# Patient Record
Sex: MALE | Race: WHITE | NOT HISPANIC OR LATINO | Employment: OTHER | URBAN - METROPOLITAN AREA
[De-identification: names, ages, dates, MRNs, and addresses within clinical notes are randomized per-mention and may not be internally consistent; named-entity substitution may affect disease eponyms.]

---

## 2023-06-03 LAB — POTASSIUM (MMOL/L) IN SER/PLAS: 4.5 MMOL/L (ref 3.5–5.3)

## 2023-06-11 PROCEDURE — G0180 MD CERTIFICATION HHA PATIENT: HCPCS | Performed by: FAMILY MEDICINE

## 2023-06-14 ENCOUNTER — TELEPHONE (OUTPATIENT)
Dept: PRIMARY CARE | Facility: CLINIC | Age: 88
End: 2023-06-14
Payer: MEDICARE

## 2023-06-14 NOTE — TELEPHONE ENCOUNTER
[facility] wanted to let you know he got picked up for Home Health Care and they are giving OT services.

## 2023-06-15 ENCOUNTER — TELEPHONE (OUTPATIENT)
Dept: PRIMARY CARE | Facility: CLINIC | Age: 88
End: 2023-06-15
Payer: MEDICARE

## 2023-06-21 LAB
ANION GAP IN SER/PLAS: 14 MMOL/L (ref 10–20)
CALCIUM (MG/DL) IN SER/PLAS: 8.2 MG/DL (ref 8.6–10.3)
CARBON DIOXIDE, TOTAL (MMOL/L) IN SER/PLAS: 19 MMOL/L (ref 21–32)
CHLORIDE (MMOL/L) IN SER/PLAS: 110 MMOL/L (ref 98–107)
CREATININE (MG/DL) IN SER/PLAS: 1.98 MG/DL (ref 0.5–1.3)
GFR MALE: 32 ML/MIN/1.73M2
GLUCOSE (MG/DL) IN SER/PLAS: 163 MG/DL (ref 74–99)
POTASSIUM (MMOL/L) IN SER/PLAS: 5.9 MMOL/L (ref 3.5–5.3)
SODIUM (MMOL/L) IN SER/PLAS: 137 MMOL/L (ref 136–145)
UREA NITROGEN (MG/DL) IN SER/PLAS: 57 MG/DL (ref 6–23)

## 2024-10-18 ENCOUNTER — OFFICE VISIT (OUTPATIENT)
Dept: URGENT CARE | Age: 89
End: 2024-10-18
Payer: MEDICARE

## 2024-10-18 VITALS
BODY MASS INDEX: 19.67 KG/M2 | DIASTOLIC BLOOD PRESSURE: 66 MMHG | SYSTOLIC BLOOD PRESSURE: 134 MMHG | HEART RATE: 82 BPM | RESPIRATION RATE: 16 BRPM | OXYGEN SATURATION: 98 % | TEMPERATURE: 97.6 F | WEIGHT: 145 LBS

## 2024-10-18 DIAGNOSIS — S91.104A OPEN WOUND OF SECOND TOE OF RIGHT FOOT, INITIAL ENCOUNTER: Primary | ICD-10-CM

## 2024-10-18 DIAGNOSIS — A46 ERYSIPELAS: ICD-10-CM

## 2024-10-18 PROCEDURE — 87075 CULTR BACTERIA EXCEPT BLOOD: CPT

## 2024-10-18 PROCEDURE — 87077 CULTURE AEROBIC IDENTIFY: CPT

## 2024-10-18 PROCEDURE — 87070 CULTURE OTHR SPECIMN AEROBIC: CPT

## 2024-10-18 RX ORDER — WARFARIN 1 MG/1
1 TABLET ORAL DAILY
COMMUNITY

## 2024-10-18 RX ORDER — SODIUM POLYSTYRENE SULFONATE 15 G/60ML
SUSPENSION ORAL
COMMUNITY

## 2024-10-18 RX ORDER — LOSARTAN POTASSIUM 25 MG/1
1 TABLET ORAL DAILY
COMMUNITY

## 2024-10-18 RX ORDER — NITROGLYCERIN 0.4 MG/1
TABLET SUBLINGUAL
COMMUNITY

## 2024-10-18 RX ORDER — CEPHALEXIN 500 MG/1
500 CAPSULE ORAL 4 TIMES DAILY
Qty: 40 CAPSULE | Refills: 0 | Status: SHIPPED | OUTPATIENT
Start: 2024-10-18 | End: 2024-10-28

## 2024-10-18 RX ORDER — ASPIRIN 81 MG/1
1 TABLET ORAL DAILY
COMMUNITY

## 2024-10-18 RX ORDER — ERGOCALCIFEROL 1.25 MG/1
1 CAPSULE ORAL
COMMUNITY

## 2024-10-18 RX ORDER — METOPROLOL SUCCINATE 50 MG/1
1 TABLET, EXTENDED RELEASE ORAL DAILY
COMMUNITY

## 2024-10-18 RX ORDER — LANSOPRAZOLE 30 MG/1
1 CAPSULE, DELAYED RELEASE ORAL DAILY
COMMUNITY

## 2024-10-18 RX ORDER — ISOSORBIDE MONONITRATE 30 MG/1
1 TABLET, EXTENDED RELEASE ORAL DAILY
COMMUNITY

## 2024-10-18 RX ORDER — MIRTAZAPINE 7.5 MG/1
1 TABLET, FILM COATED ORAL NIGHTLY
COMMUNITY
Start: 2024-10-07 | End: 2024-11-06

## 2024-10-18 RX ORDER — DOXAZOSIN 4 MG/1
1 TABLET ORAL DAILY
COMMUNITY
Start: 2024-07-05

## 2024-10-18 RX ORDER — ALPRAZOLAM 0.5 MG/1
TABLET ORAL
COMMUNITY

## 2024-10-18 RX ORDER — GABAPENTIN 100 MG/1
CAPSULE ORAL
COMMUNITY

## 2024-10-18 RX ORDER — RANOLAZINE 500 MG/1
500 TABLET, EXTENDED RELEASE ORAL
COMMUNITY
Start: 2024-07-19

## 2024-10-18 RX ORDER — QUETIAPINE FUMARATE 25 MG/1
1 TABLET, FILM COATED ORAL NIGHTLY
COMMUNITY
Start: 2024-10-07

## 2024-10-18 RX ORDER — ATORVASTATIN CALCIUM 40 MG/1
1 TABLET, FILM COATED ORAL DAILY
COMMUNITY

## 2024-10-18 RX ORDER — CELECOXIB 200 MG/1
1 CAPSULE ORAL DAILY
COMMUNITY

## 2024-10-18 RX ORDER — AMLODIPINE BESYLATE 2.5 MG/1
1 TABLET ORAL DAILY
COMMUNITY

## 2024-10-18 RX ORDER — TRAZODONE HYDROCHLORIDE 100 MG/1
100 TABLET ORAL DAILY PRN
COMMUNITY
Start: 2024-07-19 | End: 2025-07-19

## 2024-10-18 ASSESSMENT — ENCOUNTER SYMPTOMS: WOUND: 1

## 2024-10-18 ASSESSMENT — PAIN SCALES - GENERAL: PAINLEVEL_OUTOF10: 3

## 2024-10-18 NOTE — PATIENT INSTRUCTIONS
Please take antibiotics as prescribed, please follow wound care instructions as discussed.  May follow-up with wound care referral placed phone number [phone].  If pain at proportion, rapidly expanding redness or the presence of streaking please take patient to the emergency room or call [phone]

## 2024-10-18 NOTE — PROGRESS NOTES
"Subjective   Patient ID: Bebeto Cabezas is a 89 y.o. male. They present today with a chief complaint of Toe Pain (Right great toe and 2nd toe redness and swelling. Pt states he was wearing \"toe separators\" and elastic dug into 2nd toe causing symptoms. ).    History of Present Illness    Toe Pain    Patient is brought in by wife for a chief complaint of right second toe redness and swelling, believed to be caused by wound caused by toe  that patient reports left on approximately about 3 weeks as felt good to his feet.  Wife noticed redness and swelling of right foot prompting visit to the urgent care patient denies that area is tender to palpation, report of discharge from the area  Past Medical History  Allergies as of 10/18/2024    (No Known Allergies)       (Not in a hospital admission)       History reviewed. No pertinent past medical history.    History reviewed. No pertinent surgical history.         Review of Systems  Review of Systems   Skin:  Positive for wound.                                  Objective    Vitals:    10/18/24 1429   BP: 134/66   Pulse: 82   Resp: 16   Temp: 36.4 °C (97.6 °F)   SpO2: 98%   Weight: 65.8 kg (145 lb)     No LMP for male patient.    Physical Exam  Constitutional:       General: He is not in acute distress.     Appearance: Normal appearance. He is not ill-appearing, toxic-appearing or diaphoretic.   Skin:     Comments: Upon examination of sole of foot area under the right second toe there is the presence of an open wound with purulent discharge.  On dorsal aspect of foot over the digits there is the presence of erysipelas as well-defined margin that does not jumana with palpation.  Area nontender to palpation patient does have sensation of digits   Neurological:      Mental Status: He is alert and oriented to person, place, and time.   Psychiatric:         Mood and Affect: Mood normal.         Behavior: Behavior normal.         Procedures    Point of Care Test & " Imaging Results from this visit  No results found for this visit on 10/18/24.   No results found.    Diagnostic study results (if any) were reviewed by Bebeto Correia PA-C.    Assessment/Plan   Allergies, medications, history, and pertinent labs/EKGs/Imaging reviewed by Bebeto Correia PA-C.     Medical Decision Making  Wound culture obtained, patient will be placed on Keflex, I did discuss with wife that antibiotic regimen may change pending wound culture.  Several ointment packets of bacitracin provided, thin layer applied and nonadherent dressing applied, did go over wound care instructions.  I did give ER precautions such as pain out of proportion, rapidly expanding erythema or the presence of streaking if to occur patient is to be taken to the emergency room or call 911.  I also did place referral for wound care.    Orders and Diagnoses  Diagnoses and all orders for this visit:  Open wound of second toe of right foot, initial encounter  -     Tissue/Wound Culture/Smear  -     Referral to Wound Clinic; Future  -     cephalexin (Keflex) 500 mg capsule; Take 1 capsule (500 mg) by mouth 4 times a day for 10 days.      Medical Admin Record      Patient disposition: Home    Electronically signed by Bebeto Correia PA-C  2:57 PM

## 2024-10-20 LAB
BACTERIA SPEC CULT: ABNORMAL
GRAM STN SPEC: ABNORMAL
GRAM STN SPEC: ABNORMAL

## 2024-10-23 ENCOUNTER — APPOINTMENT (OUTPATIENT)
Dept: RADIOLOGY | Facility: HOSPITAL | Age: 89
End: 2024-10-23
Payer: MEDICARE

## 2024-10-23 ENCOUNTER — HOSPITAL ENCOUNTER (INPATIENT)
Facility: HOSPITAL | Age: 89
LOS: 2 days | Discharge: HOME | End: 2024-10-26
Attending: EMERGENCY MEDICINE | Admitting: STUDENT IN AN ORGANIZED HEALTH CARE EDUCATION/TRAINING PROGRAM
Payer: MEDICARE

## 2024-10-23 ENCOUNTER — APPOINTMENT (OUTPATIENT)
Dept: CARDIOLOGY | Facility: HOSPITAL | Age: 89
End: 2024-10-23
Payer: MEDICARE

## 2024-10-23 DIAGNOSIS — N39.0 LOWER URINARY TRACT INFECTION, ACUTE: ICD-10-CM

## 2024-10-23 DIAGNOSIS — N30.00 ACUTE CYSTITIS WITHOUT HEMATURIA: ICD-10-CM

## 2024-10-23 DIAGNOSIS — R10.84 GENERALIZED ABDOMINAL PAIN: Primary | ICD-10-CM

## 2024-10-23 DIAGNOSIS — I10 PRIMARY HYPERTENSION: ICD-10-CM

## 2024-10-23 DIAGNOSIS — K20.90 ESOPHAGITIS: ICD-10-CM

## 2024-10-23 DIAGNOSIS — R11.2 NAUSEA AND VOMITING, UNSPECIFIED VOMITING TYPE: ICD-10-CM

## 2024-10-23 LAB
ALBUMIN SERPL BCP-MCNC: 3.5 G/DL (ref 3.4–5)
ALP SERPL-CCNC: 76 U/L (ref 33–136)
ALT SERPL W P-5'-P-CCNC: 7 U/L (ref 10–52)
ANION GAP SERPL CALC-SCNC: 14 MMOL/L (ref 10–20)
APPEARANCE UR: CLEAR
AST SERPL W P-5'-P-CCNC: 16 U/L (ref 9–39)
B-LACTAMASE ORGANISM ISLT: NEGATIVE
BACTERIA #/AREA URNS AUTO: ABNORMAL /HPF
BACTERIA SPEC CULT: ABNORMAL
BASOPHILS # BLD AUTO: 0.04 X10*3/UL (ref 0–0.1)
BASOPHILS NFR BLD AUTO: 0.5 %
BILIRUB SERPL-MCNC: 0.7 MG/DL (ref 0–1.2)
BILIRUB UR STRIP.AUTO-MCNC: NEGATIVE MG/DL
BUN SERPL-MCNC: 24 MG/DL (ref 6–23)
CALCIUM SERPL-MCNC: 8.6 MG/DL (ref 8.6–10.3)
CARDIAC TROPONIN I PNL SERPL HS: 23 NG/L (ref 0–20)
CARDIAC TROPONIN I PNL SERPL HS: 23 NG/L (ref 0–20)
CHLORIDE SERPL-SCNC: 108 MMOL/L (ref 98–107)
CO2 SERPL-SCNC: 20 MMOL/L (ref 21–32)
COLOR UR: ABNORMAL
CREAT SERPL-MCNC: 1.78 MG/DL (ref 0.5–1.3)
EGFRCR SERPLBLD CKD-EPI 2021: 36 ML/MIN/1.73M*2
EOSINOPHIL # BLD AUTO: 0.08 X10*3/UL (ref 0–0.4)
EOSINOPHIL NFR BLD AUTO: 0.9 %
ERYTHROCYTE [DISTWIDTH] IN BLOOD BY AUTOMATED COUNT: 13.2 % (ref 11.5–14.5)
GLUCOSE SERPL-MCNC: 101 MG/DL (ref 74–99)
GLUCOSE UR STRIP.AUTO-MCNC: NORMAL MG/DL
GRAM STN SPEC: ABNORMAL
GRAM STN SPEC: ABNORMAL
HCT VFR BLD AUTO: 32.5 % (ref 41–52)
HGB BLD-MCNC: 10.6 G/DL (ref 13.5–17.5)
HYALINE CASTS #/AREA URNS AUTO: ABNORMAL /LPF
IMM GRANULOCYTES # BLD AUTO: 0.05 X10*3/UL (ref 0–0.5)
IMM GRANULOCYTES NFR BLD AUTO: 0.6 % (ref 0–0.9)
KETONES UR STRIP.AUTO-MCNC: NEGATIVE MG/DL
LACTATE SERPL-SCNC: 1.2 MMOL/L (ref 0.4–2)
LEUKOCYTE ESTERASE UR QL STRIP.AUTO: ABNORMAL
LIPASE SERPL-CCNC: 6 U/L (ref 9–82)
LYMPHOCYTES # BLD AUTO: 1.37 X10*3/UL (ref 0.8–3)
LYMPHOCYTES NFR BLD AUTO: 16.1 %
MCH RBC QN AUTO: 32.8 PG (ref 26–34)
MCHC RBC AUTO-ENTMCNC: 32.6 G/DL (ref 32–36)
MCV RBC AUTO: 101 FL (ref 80–100)
MONOCYTES # BLD AUTO: 0.7 X10*3/UL (ref 0.05–0.8)
MONOCYTES NFR BLD AUTO: 8.2 %
NEUTROPHILS # BLD AUTO: 6.28 X10*3/UL (ref 1.6–5.5)
NEUTROPHILS NFR BLD AUTO: 73.7 %
NITRITE UR QL STRIP.AUTO: ABNORMAL
NRBC BLD-RTO: 0 /100 WBCS (ref 0–0)
PH UR STRIP.AUTO: 5.5 [PH]
PLATELET # BLD AUTO: 239 X10*3/UL (ref 150–450)
POTASSIUM SERPL-SCNC: 4.1 MMOL/L (ref 3.5–5.3)
PROT SERPL-MCNC: 6 G/DL (ref 6.4–8.2)
PROT UR STRIP.AUTO-MCNC: ABNORMAL MG/DL
RBC # BLD AUTO: 3.23 X10*6/UL (ref 4.5–5.9)
RBC # UR STRIP.AUTO: ABNORMAL /UL
RBC #/AREA URNS AUTO: ABNORMAL /HPF
SODIUM SERPL-SCNC: 138 MMOL/L (ref 136–145)
SP GR UR STRIP.AUTO: 1.01
UROBILINOGEN UR STRIP.AUTO-MCNC: NORMAL MG/DL
WBC # BLD AUTO: 8.5 X10*3/UL (ref 4.4–11.3)
WBC #/AREA URNS AUTO: ABNORMAL /HPF

## 2024-10-23 PROCEDURE — 96375 TX/PRO/DX INJ NEW DRUG ADDON: CPT

## 2024-10-23 PROCEDURE — 80053 COMPREHEN METABOLIC PANEL: CPT | Performed by: EMERGENCY MEDICINE

## 2024-10-23 PROCEDURE — 81001 URINALYSIS AUTO W/SCOPE: CPT | Performed by: EMERGENCY MEDICINE

## 2024-10-23 PROCEDURE — 84484 ASSAY OF TROPONIN QUANT: CPT | Performed by: EMERGENCY MEDICINE

## 2024-10-23 PROCEDURE — 71045 X-RAY EXAM CHEST 1 VIEW: CPT | Performed by: RADIOLOGY

## 2024-10-23 PROCEDURE — 83690 ASSAY OF LIPASE: CPT | Performed by: EMERGENCY MEDICINE

## 2024-10-23 PROCEDURE — 96365 THER/PROPH/DIAG IV INF INIT: CPT

## 2024-10-23 PROCEDURE — 36415 COLL VENOUS BLD VENIPUNCTURE: CPT | Performed by: EMERGENCY MEDICINE

## 2024-10-23 PROCEDURE — 74176 CT ABD & PELVIS W/O CONTRAST: CPT

## 2024-10-23 PROCEDURE — 71045 X-RAY EXAM CHEST 1 VIEW: CPT

## 2024-10-23 PROCEDURE — 85025 COMPLETE CBC W/AUTO DIFF WBC: CPT | Performed by: EMERGENCY MEDICINE

## 2024-10-23 PROCEDURE — 87086 URINE CULTURE/COLONY COUNT: CPT | Mod: PARLAB | Performed by: EMERGENCY MEDICINE

## 2024-10-23 PROCEDURE — G0378 HOSPITAL OBSERVATION PER HR: HCPCS

## 2024-10-23 PROCEDURE — 96361 HYDRATE IV INFUSION ADD-ON: CPT

## 2024-10-23 PROCEDURE — 99284 EMERGENCY DEPT VISIT MOD MDM: CPT

## 2024-10-23 PROCEDURE — 99285 EMERGENCY DEPT VISIT HI MDM: CPT | Mod: 25

## 2024-10-23 PROCEDURE — 93005 ELECTROCARDIOGRAM TRACING: CPT

## 2024-10-23 PROCEDURE — 2500000004 HC RX 250 GENERAL PHARMACY W/ HCPCS (ALT 636 FOR OP/ED): Performed by: EMERGENCY MEDICINE

## 2024-10-23 PROCEDURE — 83605 ASSAY OF LACTIC ACID: CPT | Performed by: EMERGENCY MEDICINE

## 2024-10-23 RX ORDER — FENTANYL CITRATE 50 UG/ML
50 INJECTION, SOLUTION INTRAMUSCULAR; INTRAVENOUS ONCE
Status: COMPLETED | OUTPATIENT
Start: 2024-10-23 | End: 2024-10-23

## 2024-10-23 RX ORDER — ONDANSETRON HYDROCHLORIDE 2 MG/ML
4 INJECTION, SOLUTION INTRAVENOUS ONCE
Status: COMPLETED | OUTPATIENT
Start: 2024-10-23 | End: 2024-10-23

## 2024-10-23 RX ORDER — CEFTRIAXONE 1 G/50ML
1 INJECTION, SOLUTION INTRAVENOUS ONCE
Status: COMPLETED | OUTPATIENT
Start: 2024-10-23 | End: 2024-10-24

## 2024-10-23 ASSESSMENT — COLUMBIA-SUICIDE SEVERITY RATING SCALE - C-SSRS
6. HAVE YOU EVER DONE ANYTHING, STARTED TO DO ANYTHING, OR PREPARED TO DO ANYTHING TO END YOUR LIFE?: NO
2. HAVE YOU ACTUALLY HAD ANY THOUGHTS OF KILLING YOURSELF?: NO
1. IN THE PAST MONTH, HAVE YOU WISHED YOU WERE DEAD OR WISHED YOU COULD GO TO SLEEP AND NOT WAKE UP?: NO

## 2024-10-23 NOTE — ED PROVIDER NOTES
HPI   Chief Complaint   Patient presents with    Abdominal Pain    Constipation       Patient is a 89-year-old male, medical history significant for dementia, prior intracranial hemorrhage, chronic abdominal pain, that presents to the emergency department with abdominal pain.  Patient has a history of chronic abdominal pain.  Apparently, he is normally on Percocet.  At some point, this was held because of constipation.  Today, he was complaining of some increasing abdominal pain.  He states it is in bilateral lower quadrants.  He denies nausea.  He also admits to some slight chest pain.  Patient's history is hard to follow with his history of dementia.              Patient History   No past medical history on file.  No past surgical history on file.  No family history on file.  Social History     Tobacco Use    Smoking status: Not on file    Smokeless tobacco: Not on file   Substance Use Topics    Alcohol use: Not on file    Drug use: Not on file       Physical Exam   ED Triage Vitals   Temp Pulse Resp BP   -- -- -- --      SpO2 Temp src Heart Rate Source Patient Position   -- -- -- --      BP Location FiO2 (%)     -- --       Physical Exam  Vitals and nursing note reviewed.   Constitutional:       General: He is not in acute distress.     Appearance: He is well-developed.   HENT:      Head: Normocephalic and atraumatic.   Eyes:      Extraocular Movements: Extraocular movements intact.      Conjunctiva/sclera: Conjunctivae normal.      Pupils: Pupils are equal, round, and reactive to light.   Cardiovascular:      Rate and Rhythm: Normal rate and regular rhythm.      Heart sounds: No murmur heard.  Pulmonary:      Effort: Pulmonary effort is normal. No respiratory distress.      Breath sounds: Normal breath sounds.   Abdominal:      General: Abdomen is flat.      Palpations: Abdomen is soft.      Tenderness: There is abdominal tenderness.   Musculoskeletal:         General: No swelling.      Cervical back: Neck  supple.   Skin:     General: Skin is warm and dry.      Capillary Refill: Capillary refill takes less than 2 seconds.   Neurological:      General: No focal deficit present.      Mental Status: He is alert. Mental status is at baseline.   Psychiatric:         Mood and Affect: Mood normal.           ED Course & [facility]   ED Course as of 10/23/24 2311   Wed Oct 23, 2024   1938 Troponin indeterminate at 23. [MK]   2005 Lactic acid is normal. [MK]   2005 Metabolic panel does demonstrate mildly decreased bicarb.  Creatinine is at the patient's baseline. [MK]   2102 Cardiac enzymes stable. [MK]   2150 CT does not show any evidence of acute intra-abdominal process. [MK]   2221 Urine does show some trace evidence of infection. [MK]      ED Course User Index  [MK] Alexi Garnica MD         Diagnoses as of 10/23/24 2311   Generalized abdominal pain   Nausea and vomiting, unspecified vomiting type   Lower urinary tract infection, acute                 No data recorded     Samia Coma Scale Score: 14 (10/23/24 1918 : Moni Byrd RN)                           Medical Decision Making  Medical Decision Making: I was able get more history from the patient's family when they arrived.  Apparently, the patient was diagnosed with a toe infection.  He was placed on Bactrim.  He had stopped his Percocet over the weekend but it was resumed about 24 hours ago.  He had multiple large bowel movements over the weekend.  However, for the past 3 days he has been complaining of chest pain, difficulty swallowing, and upper abdominal pain.  Even with his Percocet, his pain was not controlled.  Laboratory workup was pursued.  Patient does have mildly diminished bicarb likely secondary from his vomiting and diminished oral intake.  His renal function is at his baseline.  We did obtain CT imaging.  There is no evidence of acute intra-abdominal process.  At this point, I do for the patient would benefit from observation given persistence of  symptoms.    EKG interpreted by myself (ED attending physician): Sinus rhythm.  First-degree AV block.  Left axis.  Right bundle branch block.  No acute ischemia.  No STEMI.    Differential Diagnoses Considered: Bowel obstruction, pancreatitis, opiate withdrawal, ACS    Chronic Medical Conditions Significantly Affecting Care: History of chronic abdominal pain    External Records Reviewed: I reviewed recent and relevant outside records including: Outpatient medication reconciliation    Independent Interpretation of Studies:  I independently interpreted: CT obtained with    Escalation of Care:  Appropriate for observation    Social Determinants of Health Significantly Affecting Care:  No known social determinant    Prescription Drug Consideration: IV fluids, analgesia    Diagnostic testing considered: 6    Discussion of Management with Other Providers:   I discussed the patient/results with: Admitting provider agrees plan of care          Procedure  Procedures     Alexi Garnica MD  10/23/24 6929       Alexi Garnica MD  10/23/24 7365     Vemu

## 2024-10-23 NOTE — ED TRIAGE NOTES
Pt comes to the ED via EMS from home. He stopped taking his percocet a few days ago due to constipation. He is still experiencing constipation as well as lower abdominal pain.

## 2024-10-24 ENCOUNTER — ANESTHESIA (OUTPATIENT)
Dept: GASTROENTEROLOGY | Facility: HOSPITAL | Age: 89
End: 2024-10-24
Payer: MEDICARE

## 2024-10-24 ENCOUNTER — APPOINTMENT (OUTPATIENT)
Dept: GASTROENTEROLOGY | Facility: HOSPITAL | Age: 89
End: 2024-10-24
Payer: MEDICARE

## 2024-10-24 ENCOUNTER — ANESTHESIA EVENT (OUTPATIENT)
Dept: GASTROENTEROLOGY | Facility: HOSPITAL | Age: 89
End: 2024-10-24
Payer: MEDICARE

## 2024-10-24 PROBLEM — R10.84 GENERALIZED ABDOMINAL PAIN: Status: ACTIVE | Noted: 2024-10-24

## 2024-10-24 LAB
ANION GAP SERPL CALC-SCNC: 13 MMOL/L (ref 10–20)
ATRIAL RATE: 78 BPM
BUN SERPL-MCNC: 23 MG/DL (ref 6–23)
CALCIUM SERPL-MCNC: 8.1 MG/DL (ref 8.6–10.3)
CHLORIDE SERPL-SCNC: 108 MMOL/L (ref 98–107)
CO2 SERPL-SCNC: 20 MMOL/L (ref 21–32)
CREAT SERPL-MCNC: 1.71 MG/DL (ref 0.5–1.3)
EGFRCR SERPLBLD CKD-EPI 2021: 38 ML/MIN/1.73M*2
GLUCOSE SERPL-MCNC: 81 MG/DL (ref 74–99)
HOLD SPECIMEN: NORMAL
HOLD SPECIMEN: NORMAL
P AXIS: 15 DEGREES
P OFFSET: 136 MS
P ONSET: 68 MS
POTASSIUM SERPL-SCNC: 4.2 MMOL/L (ref 3.5–5.3)
PR INTERVAL: 294 MS
Q ONSET: 215 MS
QRS COUNT: 13 BEATS
QRS DURATION: 122 MS
QT INTERVAL: 402 MS
QTC CALCULATION(BAZETT): 458 MS
QTC FREDERICIA: 438 MS
R AXIS: -64 DEGREES
SODIUM SERPL-SCNC: 137 MMOL/L (ref 136–145)
T AXIS: 9 DEGREES
T OFFSET: 416 MS
VENTRICULAR RATE: 78 BPM

## 2024-10-24 PROCEDURE — 7100000002 HC RECOVERY ROOM TIME - EACH INCREMENTAL 1 MINUTE

## 2024-10-24 PROCEDURE — 3700000001 HC GENERAL ANESTHESIA TIME - INITIAL BASE CHARGE

## 2024-10-24 PROCEDURE — 1100000001 HC PRIVATE ROOM DAILY

## 2024-10-24 PROCEDURE — 92610 EVALUATE SWALLOWING FUNCTION: CPT | Mod: GN

## 2024-10-24 PROCEDURE — 2500000002 HC RX 250 W HCPCS SELF ADMINISTERED DRUGS (ALT 637 FOR MEDICARE OP, ALT 636 FOR OP/ED): Performed by: INTERNAL MEDICINE

## 2024-10-24 PROCEDURE — 3700000002 HC GENERAL ANESTHESIA TIME - EACH INCREMENTAL 1 MINUTE

## 2024-10-24 PROCEDURE — 2500000001 HC RX 250 WO HCPCS SELF ADMINISTERED DRUGS (ALT 637 FOR MEDICARE OP): Performed by: INTERNAL MEDICINE

## 2024-10-24 PROCEDURE — 80048 BASIC METABOLIC PNL TOTAL CA: CPT | Performed by: INTERNAL MEDICINE

## 2024-10-24 PROCEDURE — 2500000002 HC RX 250 W HCPCS SELF ADMINISTERED DRUGS (ALT 637 FOR MEDICARE OP, ALT 636 FOR OP/ED): Performed by: STUDENT IN AN ORGANIZED HEALTH CARE EDUCATION/TRAINING PROGRAM

## 2024-10-24 PROCEDURE — 2500000004 HC RX 250 GENERAL PHARMACY W/ HCPCS (ALT 636 FOR OP/ED): Performed by: STUDENT IN AN ORGANIZED HEALTH CARE EDUCATION/TRAINING PROGRAM

## 2024-10-24 PROCEDURE — 2500000001 HC RX 250 WO HCPCS SELF ADMINISTERED DRUGS (ALT 637 FOR MEDICARE OP): Performed by: NURSE PRACTITIONER

## 2024-10-24 PROCEDURE — 2500000001 HC RX 250 WO HCPCS SELF ADMINISTERED DRUGS (ALT 637 FOR MEDICARE OP): Performed by: STUDENT IN AN ORGANIZED HEALTH CARE EDUCATION/TRAINING PROGRAM

## 2024-10-24 PROCEDURE — 43239 EGD BIOPSY SINGLE/MULTIPLE: CPT | Performed by: STUDENT IN AN ORGANIZED HEALTH CARE EDUCATION/TRAINING PROGRAM

## 2024-10-24 PROCEDURE — 2500000004 HC RX 250 GENERAL PHARMACY W/ HCPCS (ALT 636 FOR OP/ED): Performed by: NURSE ANESTHETIST, CERTIFIED REGISTERED

## 2024-10-24 PROCEDURE — 7100000001 HC RECOVERY ROOM TIME - INITIAL BASE CHARGE

## 2024-10-24 PROCEDURE — 99222 1ST HOSP IP/OBS MODERATE 55: CPT | Performed by: INTERNAL MEDICINE

## 2024-10-24 PROCEDURE — 36415 COLL VENOUS BLD VENIPUNCTURE: CPT | Performed by: INTERNAL MEDICINE

## 2024-10-24 PROCEDURE — 0DB38ZX EXCISION OF LOWER ESOPHAGUS, VIA NATURAL OR ARTIFICIAL OPENING ENDOSCOPIC, DIAGNOSTIC: ICD-10-PCS | Performed by: STUDENT IN AN ORGANIZED HEALTH CARE EDUCATION/TRAINING PROGRAM

## 2024-10-24 PROCEDURE — 99223 1ST HOSP IP/OBS HIGH 75: CPT | Performed by: STUDENT IN AN ORGANIZED HEALTH CARE EDUCATION/TRAINING PROGRAM

## 2024-10-24 RX ORDER — ISOSORBIDE MONONITRATE 30 MG/1
30 TABLET, EXTENDED RELEASE ORAL 2 TIMES DAILY
Status: DISCONTINUED | OUTPATIENT
Start: 2024-10-24 | End: 2024-10-26 | Stop reason: HOSPADM

## 2024-10-24 RX ORDER — AMOXICILLIN 250 MG
2 CAPSULE ORAL 2 TIMES DAILY
Status: DISCONTINUED | OUTPATIENT
Start: 2024-10-24 | End: 2024-10-26 | Stop reason: HOSPADM

## 2024-10-24 RX ORDER — AMLODIPINE BESYLATE 5 MG/1
5 TABLET ORAL DAILY PRN
Status: DISCONTINUED | OUTPATIENT
Start: 2024-10-24 | End: 2024-10-26 | Stop reason: HOSPADM

## 2024-10-24 RX ORDER — TRAZODONE HYDROCHLORIDE 50 MG/1
25 TABLET ORAL NIGHTLY
Status: DISCONTINUED | OUTPATIENT
Start: 2024-10-24 | End: 2024-10-26 | Stop reason: HOSPADM

## 2024-10-24 RX ORDER — MELATONIN 10 MG
10 CAPSULE ORAL NIGHTLY
COMMUNITY

## 2024-10-24 RX ORDER — DEXTROMETHORPHAN/PSEUDOEPHED 2.5-7.5/.8
80 DROPS ORAL 4 TIMES DAILY
Status: DISCONTINUED | OUTPATIENT
Start: 2024-10-24 | End: 2024-10-26 | Stop reason: HOSPADM

## 2024-10-24 RX ORDER — RANOLAZINE 500 MG/1
500 TABLET, EXTENDED RELEASE ORAL DAILY
Status: DISCONTINUED | OUTPATIENT
Start: 2024-10-24 | End: 2024-10-26 | Stop reason: HOSPADM

## 2024-10-24 RX ORDER — SUCRALFATE 1 G/1
1 TABLET ORAL
Status: DISCONTINUED | OUTPATIENT
Start: 2024-10-24 | End: 2024-10-24

## 2024-10-24 RX ORDER — QUETIAPINE FUMARATE 25 MG/1
12.5 TABLET, FILM COATED ORAL NIGHTLY
Status: DISCONTINUED | OUTPATIENT
Start: 2024-10-24 | End: 2024-10-26 | Stop reason: HOSPADM

## 2024-10-24 RX ORDER — CEFTRIAXONE 1 G/50ML
1 INJECTION, SOLUTION INTRAVENOUS DAILY
Status: DISCONTINUED | OUTPATIENT
Start: 2024-10-24 | End: 2024-10-24

## 2024-10-24 RX ORDER — SUCRALFATE 1 G/10ML
1 SUSPENSION ORAL EVERY 6 HOURS SCHEDULED
Status: DISCONTINUED | OUTPATIENT
Start: 2024-10-24 | End: 2024-10-26 | Stop reason: HOSPADM

## 2024-10-24 RX ORDER — LABETALOL HYDROCHLORIDE 5 MG/ML
10 INJECTION, SOLUTION INTRAVENOUS EVERY 4 HOURS PRN
Status: DISCONTINUED | OUTPATIENT
Start: 2024-10-24 | End: 2024-10-26 | Stop reason: HOSPADM

## 2024-10-24 RX ORDER — SULFAMETHOXAZOLE AND TRIMETHOPRIM 800; 160 MG/1; MG/1
1 TABLET ORAL EVERY 12 HOURS SCHEDULED
Status: DISCONTINUED | OUTPATIENT
Start: 2024-10-24 | End: 2024-10-26

## 2024-10-24 RX ORDER — SODIUM CHLORIDE 0.9 % (FLUSH) 0.9 %
SYRINGE (ML) INJECTION AS NEEDED
Status: DISCONTINUED | OUTPATIENT
Start: 2024-10-24 | End: 2024-10-24

## 2024-10-24 RX ORDER — ONDANSETRON HYDROCHLORIDE 2 MG/ML
4 INJECTION, SOLUTION INTRAVENOUS EVERY 8 HOURS PRN
Status: DISCONTINUED | OUTPATIENT
Start: 2024-10-24 | End: 2024-10-26 | Stop reason: HOSPADM

## 2024-10-24 RX ORDER — METOPROLOL SUCCINATE 25 MG/1
25 TABLET, EXTENDED RELEASE ORAL DAILY
COMMUNITY
End: 2024-10-26 | Stop reason: HOSPADM

## 2024-10-24 RX ORDER — ACETAMINOPHEN 325 MG/1
650 TABLET ORAL EVERY 4 HOURS PRN
Status: DISCONTINUED | OUTPATIENT
Start: 2024-10-24 | End: 2024-10-26 | Stop reason: HOSPADM

## 2024-10-24 RX ORDER — PROPOFOL 10 MG/ML
INJECTION, EMULSION INTRAVENOUS AS NEEDED
Status: DISCONTINUED | OUTPATIENT
Start: 2024-10-24 | End: 2024-10-24

## 2024-10-24 RX ORDER — POLYETHYLENE GLYCOL 3350 17 G/17G
17 POWDER, FOR SOLUTION ORAL 2 TIMES DAILY
Status: DISCONTINUED | OUTPATIENT
Start: 2024-10-24 | End: 2024-10-26 | Stop reason: HOSPADM

## 2024-10-24 RX ORDER — PANTOPRAZOLE SODIUM 40 MG/1
40 TABLET, DELAYED RELEASE ORAL
Status: DISCONTINUED | OUTPATIENT
Start: 2024-10-24 | End: 2024-10-26 | Stop reason: HOSPADM

## 2024-10-24 RX ORDER — DOXAZOSIN 2 MG/1
4 TABLET ORAL NIGHTLY
Status: DISCONTINUED | OUTPATIENT
Start: 2024-10-24 | End: 2024-10-26 | Stop reason: HOSPADM

## 2024-10-24 RX ORDER — ACETAMINOPHEN 500 MG
5 TABLET ORAL NIGHTLY PRN
Status: DISCONTINUED | OUTPATIENT
Start: 2024-10-24 | End: 2024-10-24

## 2024-10-24 RX ORDER — METOPROLOL SUCCINATE 25 MG/1
25 TABLET, EXTENDED RELEASE ORAL DAILY
Status: DISCONTINUED | OUTPATIENT
Start: 2024-10-24 | End: 2024-10-26

## 2024-10-24 RX ORDER — TRAZODONE HYDROCHLORIDE 50 MG/1
25 TABLET ORAL NIGHTLY
COMMUNITY

## 2024-10-24 RX ORDER — LIDOCAINE HCL/PF 100 MG/5ML
SYRINGE (ML) INTRAVENOUS AS NEEDED
Status: DISCONTINUED | OUTPATIENT
Start: 2024-10-24 | End: 2024-10-24

## 2024-10-24 RX ORDER — PANTOPRAZOLE SODIUM 40 MG/1
40 TABLET, DELAYED RELEASE ORAL DAILY
Status: DISCONTINUED | OUTPATIENT
Start: 2024-10-24 | End: 2024-10-24

## 2024-10-24 RX ORDER — ACETAMINOPHEN 500 MG
10 TABLET ORAL NIGHTLY PRN
Status: DISCONTINUED | OUTPATIENT
Start: 2024-10-24 | End: 2024-10-26 | Stop reason: HOSPADM

## 2024-10-24 RX ORDER — POLYETHYLENE GLYCOL 3350 17 G/17G
17 POWDER, FOR SOLUTION ORAL DAILY PRN
Status: DISCONTINUED | OUTPATIENT
Start: 2024-10-24 | End: 2024-10-26 | Stop reason: HOSPADM

## 2024-10-24 SDOH — SOCIAL STABILITY: SOCIAL INSECURITY: WITHIN THE LAST YEAR, HAVE YOU BEEN AFRAID OF YOUR PARTNER OR EX-PARTNER?: NO

## 2024-10-24 SDOH — SOCIAL STABILITY: SOCIAL INSECURITY: WERE YOU ABLE TO COMPLETE ALL THE BEHAVIORAL HEALTH SCREENINGS?: YES

## 2024-10-24 SDOH — ECONOMIC STABILITY: FOOD INSECURITY: WITHIN THE PAST 12 MONTHS, THE FOOD YOU BOUGHT JUST DIDN'T LAST AND YOU DIDN'T HAVE MONEY TO GET MORE.: NEVER TRUE

## 2024-10-24 SDOH — SOCIAL STABILITY: SOCIAL INSECURITY: HAS ANYONE EVER THREATENED TO HURT YOUR FAMILY OR YOUR PETS?: NO

## 2024-10-24 SDOH — ECONOMIC STABILITY: INCOME INSECURITY: IN THE PAST 12 MONTHS HAS THE ELECTRIC, GAS, OIL, OR WATER COMPANY THREATENED TO SHUT OFF SERVICES IN YOUR HOME?: NO

## 2024-10-24 SDOH — SOCIAL STABILITY: SOCIAL INSECURITY: ABUSE: ADULT

## 2024-10-24 SDOH — SOCIAL STABILITY: SOCIAL INSECURITY: ARE YOU OR HAVE YOU BEEN THREATENED OR ABUSED PHYSICALLY, EMOTIONALLY, OR SEXUALLY BY ANYONE?: NO

## 2024-10-24 SDOH — ECONOMIC STABILITY: FOOD INSECURITY: WITHIN THE PAST 12 MONTHS, YOU WORRIED THAT YOUR FOOD WOULD RUN OUT BEFORE YOU GOT THE MONEY TO BUY MORE.: NEVER TRUE

## 2024-10-24 SDOH — SOCIAL STABILITY: SOCIAL INSECURITY: HAVE YOU HAD THOUGHTS OF HARMING ANYONE ELSE?: NO

## 2024-10-24 SDOH — SOCIAL STABILITY: SOCIAL INSECURITY: HAVE YOU HAD ANY THOUGHTS OF HARMING ANYONE ELSE?: NO

## 2024-10-24 SDOH — SOCIAL STABILITY: SOCIAL INSECURITY: DO YOU FEEL ANYONE HAS EXPLOITED OR TAKEN ADVANTAGE OF YOU FINANCIALLY OR OF YOUR PERSONAL PROPERTY?: NO

## 2024-10-24 SDOH — SOCIAL STABILITY: SOCIAL INSECURITY: DO YOU FEEL UNSAFE GOING BACK TO THE PLACE WHERE YOU ARE LIVING?: NO

## 2024-10-24 SDOH — SOCIAL STABILITY: SOCIAL INSECURITY: ARE THERE ANY APPARENT SIGNS OF INJURIES/BEHAVIORS THAT COULD BE RELATED TO ABUSE/NEGLECT?: NO

## 2024-10-24 SDOH — SOCIAL STABILITY: SOCIAL INSECURITY
WITHIN THE LAST YEAR, HAVE YOU BEEN KICKED, HIT, SLAPPED, OR OTHERWISE PHYSICALLY HURT BY YOUR PARTNER OR EX-PARTNER?: NO

## 2024-10-24 SDOH — SOCIAL STABILITY: SOCIAL INSECURITY: WITHIN THE LAST YEAR, HAVE YOU BEEN HUMILIATED OR EMOTIONALLY ABUSED IN OTHER WAYS BY YOUR PARTNER OR EX-PARTNER?: NO

## 2024-10-24 SDOH — SOCIAL STABILITY: SOCIAL INSECURITY: DOES ANYONE TRY TO KEEP YOU FROM HAVING/CONTACTING OTHER FRIENDS OR DOING THINGS OUTSIDE YOUR HOME?: NO

## 2024-10-24 SDOH — SOCIAL STABILITY: SOCIAL INSECURITY
WITHIN THE LAST YEAR, HAVE YOU BEEN RAPED OR FORCED TO HAVE ANY KIND OF SEXUAL ACTIVITY BY YOUR PARTNER OR EX-PARTNER?: NO

## 2024-10-24 SDOH — HEALTH STABILITY: MENTAL HEALTH: CURRENT SMOKER: 0

## 2024-10-24 ASSESSMENT — ACTIVITIES OF DAILY LIVING (ADL)
DRESSING YOURSELF: NEEDS ASSISTANCE
JUDGMENT_ADEQUATE_SAFELY_COMPLETE_DAILY_ACTIVITIES: YES
ADEQUATE_TO_COMPLETE_ADL: YES
PATIENT'S MEMORY ADEQUATE TO SAFELY COMPLETE DAILY ACTIVITIES?: YES
HEARING - RIGHT EAR: FUNCTIONAL
LACK_OF_TRANSPORTATION: NO
HEARING - LEFT EAR: FUNCTIONAL
TOILETING: NEEDS ASSISTANCE
ASSISTIVE_DEVICE: WHEELCHAIR
BATHING: NEEDS ASSISTANCE
GROOMING: INDEPENDENT
WALKS IN HOME: NEEDS ASSISTANCE
FEEDING YOURSELF: INDEPENDENT

## 2024-10-24 ASSESSMENT — ENCOUNTER SYMPTOMS
FREQUENCY: 0
DIARRHEA: 1
SHORTNESS OF BREATH: 0
DIFFICULTY URINATING: 0
FEVER: 0
WHEEZING: 0
CHILLS: 0
DIZZINESS: 0
HEADACHES: 0
COUGH: 0
CONSTIPATION: 0
NAUSEA: 1
MYALGIAS: 0
VOMITING: 1
DYSURIA: 0
WOUND: 0
SORE THROAT: 0
PALPITATIONS: 0
ABDOMINAL PAIN: 0

## 2024-10-24 ASSESSMENT — LIFESTYLE VARIABLES
AUDIT-C TOTAL SCORE: 0
HOW MANY STANDARD DRINKS CONTAINING ALCOHOL DO YOU HAVE ON A TYPICAL DAY: PATIENT DOES NOT DRINK
SKIP TO QUESTIONS 9-10: 1
HOW OFTEN DO YOU HAVE 6 OR MORE DRINKS ON ONE OCCASION: NEVER
HOW OFTEN DO YOU HAVE A DRINK CONTAINING ALCOHOL: NEVER
AUDIT-C TOTAL SCORE: 0
AUDIT-C TOTAL SCORE: 0
HOW OFTEN DO YOU HAVE 6 OR MORE DRINKS ON ONE OCCASION: NEVER
HOW MANY STANDARD DRINKS CONTAINING ALCOHOL DO YOU HAVE ON A TYPICAL DAY: PATIENT DOES NOT DRINK
AUDIT-C TOTAL SCORE: 0
SKIP TO QUESTIONS 9-10: 1
HOW OFTEN DO YOU HAVE A DRINK CONTAINING ALCOHOL: NEVER

## 2024-10-24 ASSESSMENT — PAIN - FUNCTIONAL ASSESSMENT
PAIN_FUNCTIONAL_ASSESSMENT: 0-10

## 2024-10-24 ASSESSMENT — COGNITIVE AND FUNCTIONAL STATUS - GENERAL
TOILETING: A LOT
PATIENT BASELINE BEDBOUND: NO
MOVING TO AND FROM BED TO CHAIR: A LOT
HELP NEEDED FOR BATHING: A LITTLE
DRESSING REGULAR UPPER BODY CLOTHING: A LITTLE
MOBILITY SCORE: 13
WALKING IN HOSPITAL ROOM: A LOT
DAILY ACTIVITIY SCORE: 17
STANDING UP FROM CHAIR USING ARMS: A LOT
PERSONAL GROOMING: A LITTLE
EATING MEALS: A LITTLE
CLIMB 3 TO 5 STEPS WITH RAILING: TOTAL
DRESSING REGULAR LOWER BODY CLOTHING: A LITTLE
MOVING FROM LYING ON BACK TO SITTING ON SIDE OF FLAT BED WITH BEDRAILS: A LITTLE
TURNING FROM BACK TO SIDE WHILE IN FLAT BAD: A LITTLE

## 2024-10-24 ASSESSMENT — PAIN SCALES - GENERAL
PAINLEVEL_OUTOF10: 0 - NO PAIN
PAIN_LEVEL: 0

## 2024-10-24 ASSESSMENT — PATIENT HEALTH QUESTIONNAIRE - PHQ9
1. LITTLE INTEREST OR PLEASURE IN DOING THINGS: NOT AT ALL
2. FEELING DOWN, DEPRESSED OR HOPELESS: NOT AT ALL
1. LITTLE INTEREST OR PLEASURE IN DOING THINGS: NOT AT ALL
SUM OF ALL RESPONSES TO PHQ9 QUESTIONS 1 & 2: 0

## 2024-10-24 NOTE — PROGRESS NOTES
"Speech-Language Pathology    SLP Adult Inpatient Speech-Language Pathology Clinical Swallow Evaluation    Patient Name: Bebeto Cabezas  MRN: [id number]  Today's Date: 10/24/2024   Time Calculation  Start Time: 0855  Stop Time: 0915  Time Calculation (min): 20 min         Current Problem:   1. Generalized abdominal pain        2. Nausea and vomiting, unspecified vomiting type        3. Lower urinary tract infection, acute              Recommendations:  Additional Recommendations: GI, ENT  Solid Diet Recommendations :  (Full Liquids diet)  Liquid Diet Recommendations: Thin (IDDSI Level 0)  Compensatory Swallowing Strategies: Upright 90 degrees as possible for all oral intake, Remain upright for 20-30 minutes after meals, Alternate solids and liquids, Small bites/sips, Eat/feed slowly  Medication Administration Recommendations: Crushed, With Pureed       Assessment: c/o a burning sensation with swallowing pointing to the Upper to mid esophageal region with max facial grimacing with swallows. \" It burns\"   -Min increased oral prep phase of the swallow given mild general weakness and incomplete full natural dentition and suspected functional pharyngeal swallow given clinical bedside indicators.    ? Infection /candida in the esophagus or lower throat region.? Defer to GI initially then  ENT     Pt is managing secretions, tongue protrudes to midline, no noticeable facial droop.  -PO trials of multiple water tsp ,cup, and straw thins sips , purees  soft solids, and regular  solids self administered.   No notable oral thrush viewed.     -ORAL PHASE: labial seal intact on cup and straw and tsp. There is no anterior loss of the oral bolus.  Mastication on adequate/natural incomplete full dentition is mildly slow ; oral bolus formation and manipulation of regular hard solids mildly slow . No oral pocketing. No significant oral residue after the swallow with trace to mild amounts noted with eventual full bolus recollection " when cued to take a thin liquid rinse.     -PHARYNGEAL PHASE; no overt s/s aspiration with all consistencies. No change in vocal quality or respirations with PO intake with pt c/o max burning with swallowing in the upper to mid esophageal region.    Will suggest pt continue  a bland oral diet of full liquids  to start as room temperature water and cream of wheat appearing the best tolerated PO without max grimacing d/t the c/o discomfort with swallowing across sip/bites.       Plan:  Inpatient/Swing Bed or Outpatient: Inpatient  SLP Plan: Skilled SLP  SLP Frequency: 3x per week  Duration: 1 week  Diet Recommendations: Solid  Solid Consistency: Other (Comment) (Full liquids bland diet)  Liquid Consistency: Thin (IDDSI Level 0)  Discussed POC: Patient, Nursing, Physician  Discussed Risks/Benefits: Yes, Nursing, Physician (Haroldo)  Patient/Caregiver Agreeable: Yes      Goals : Established 10/24 2/24      Pt will tolerate the prescribed diet with adequate oral phase and no s/s of aspiration.   Advancing PO trials with SLP  as tolerated for diet advancement   Pt will tolerate the LRD with no c/o odynphagia across meals /PO.                Subjective Alert, feeds self, talkative, [place]   Current Problem:  Constipation       General Visit Information:  Patient Class: Inpatient  Living Environment: Home  [employer] Diet: RG7/thins  Current Diet : RG&/thins    H & P : Copied from the EMR:    89 y.o. 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    Patient poor historian.    Recently diagnosed with a toe infection and was started on Bactrim.  Patient reported had multiple large bowel movements over the weekend and has been complaining of abdominal pain for the past 3 days.  There is also reports of nausea and vomiting, chest pain and difficulty  swallowing.    CT imaging obtained and was negative for acute intra-abdominal process.  Troponin checked and was 23, this was repeated and was 23 once again.    Urinalysis positive for infection.    Vital Signs:     Room air , RR 19, afebrile 97.9 , stable respiratory skills , WBC 8.5     Objective        CXR completed 10/23 24   IMPRESSION:  1. Ill-defined interstitial opacities of the bilateral mid/lower lung  zones with differential considerations to include chronic lung  disease versus pulmonary edema.  2. Hypoinflated lungs.      Baseline Assessment:  Respiratory Status: Room air  Behavior/Cognition: Alert, Cooperative, Pleasant mood  Patient Positioning: Upright in Bed  Baseline Vocal Quality: Normal  Volitional Cough: Strong  Volitional Swallow: Within Functional Limits      Pain:  Pain Assessment  Pain Assessment: 0-10    Oral/Motor Assessment:  Dentition: Adequate/Natural, Some Missing Teeth  Oral Motor: Within Functional Limits  Facial Symmetry: Within Functional Limits  Labial ROM: Within Functional Limits  Lingual ROM: Within Functional Limits  Lingual Symmetry: Within Functional Limits  Intelligibility: Intelligible      Consistencies Trialed:  Consistencies Trialed: Yes  Consistencies Trialed: Thin (IDDSI Level 0) - Straw, Thin (IDDSI Level 0) - Cup, Pureed/extremely thick (IDDSI Level 4), Soft & bite sized/chopped (IDDSI Level 6), Regular (IDDSI Level 7)      Clinical Observations:  Patient Positioning: Upright in Bed  Management of Oral Secretions: Adequate  Was The 3 oz Swallow Protocol Completed: No  Prolonged Oral Manipulation: Regular (IDDSI Level 7)  Impaired Mastication: Regular (IDDSI Level 7)      Inpatient Education :   SLP has provided pt /RN Miranda Summers  with the results and recommendations of this session.  Message left for the pt's wife to obtain more history regarding the pt's c/o burning sensation with swallowing .       Consultations/Referrals/Coordination of Services: GI , EGD  scope ? , then ENT if scope negative

## 2024-10-24 NOTE — CARE PLAN
"The patient's goals for the shift include \"to find out what antibotic to use\"    The clinical goals for the shift include Patient will not have any abdominal pain throughout the shift.    "

## 2024-10-24 NOTE — CARE PLAN
"The patient's goals for the shift include  \"to figure out what antibiotic to use\"    The clinical goals for the shift include  pt will not have any abdominal pain    Over the shift, the patient did not have any abdominal pain will continue to monitor    "

## 2024-10-24 NOTE — CONSULTS
Inpatient consult to Gastroenterology  Consult performed by: Tunde Mustafa MD  Consult ordered by: Amador Summers DO          89-year-old gentleman with history of DVT PE IVC filter, on Eliquis, subdural hemorrhage, CKD presenting with abdominal discomfort with 1 episode of black stools few days ago.  Patient denies similar symptoms in the past.  He can have 7 days without any bowel movements.  He was recently given antibiotics for a wound in his toes.    EGD never  Family history reviewed, not pertinent to chief complaint  1 bowel movement up to every 7 days  No reported alcohol abuse             The note was created using voice recognition transcription software. Despite proofreading, unintentional typographical errors may be present. Please contact the GI office with any questions or concerns.     Current Medications: reviewed    A 10 point review of system is negative except for what is mentioned in the HPI    Follow up with GI was advised       Vital Signs: Reviewed    Physical Exam:  General: no apparent distress, pleasant and cooperative  Skin:  Warm and dry, no jaundice  HEENT: No scleral icterus, no conjunctival pallor, normocephalic, atraumatic, mucous membranes moist  Neck:  atraumatic, trachea midline, no JVD  Chest:  decreased air entry to auscultation bilaterally. No wheezes, rales, or rhonchi  CV:  Regular rate and rhythm.  Positive S1/S2  Abdomen: no distension, +BS, soft, non-tender to palpation, no rebound tenderness, no guarding, no rigidity, no discernible ascites   Extremities: lower extremity edema, Chronic pigmentary changes, no cyanosis  Neurological:  A&Ox3 , no asterixis  Psychiatric: cooperative     Investigations:  Labs, radiological imaging and cardiac work up were reviewed    1-abdominal pain with 1 episode of melena, episode of odynophagia that is worsening since last few days, Protonix daily, antireflux measures, will proceed with a EGD, on Eliquis 2.5 mg twice a day    2-chronic  constipation, MiraLAX daily, Gas-X as needed

## 2024-10-24 NOTE — ANESTHESIA POSTPROCEDURE EVALUATION
Patient: Bebeto Cabezas    Procedure Summary       Date: 10/24/24 Room / Location: [facility]    Anesthesia Start: 1439 Anesthesia Stop: 1458    Procedure: EGD Diagnosis: Generalized abdominal pain    Scheduled Providers: Emil Cardozo MD Responsible Provider: Alexi Garcia MD    Anesthesia Type: MAC ASA Status: 3            Anesthesia Type: MAC    Vitals Value Taken Time   /76 10/24/24 1500   Temp 36.1 10/24/24 1500   Pulse 79 10/24/24 1500   Resp 18 10/24/24 1500   SpO2 100 10/24/24 1500       Anesthesia Post Evaluation    Patient location during evaluation: PACU  Patient participation: complete - patient participated  Level of consciousness: awake and sleepy but conscious  Pain score: 0  Pain management: adequate  Airway patency: patent  Cardiovascular status: acceptable  Respiratory status: acceptable, nonlabored ventilation, spontaneous ventilation and face mask  Hydration status: acceptable  Postoperative Nausea and Vomiting: none        There were no known notable events for this encounter.

## 2024-10-24 NOTE — ANESTHESIA PREPROCEDURE EVALUATION
Patient: Bebeto Cabezas    Procedure Information       Date/Time: 10/24/24 1330    Scheduled providers: Emil Cardozo MD    Procedure: EGD    Location: [facility]            Relevant Problems   Anesthesia (within normal limits)      /Renal   (+) UTI (urinary tract infection)      ID   (+) UTI (urinary tract infection)       Clinical information reviewed:   Tobacco  Allergies  Meds  Problems  Med Hx  Surg Hx   Fam Hx  Soc   Hx        NPO Detail:  NPO/Void Status  Carbohydrate Drink Given Prior to Surgery? : N  Date of Last Liquid: 10/24/24  Time of Last Liquid: 0800  Date of Last Solid: 10/24/24  Time of Last Solid: 0800  Last Intake Type: Light meal         Physical Exam    Airway  Mallampati: II  TM distance: >3 FB  Neck ROM: full     Cardiovascular - normal exam     Dental   (+) upper dentures     Pulmonary - normal exam     Abdominal - normal exam             Anesthesia Plan    History of general anesthesia?: yes  History of complications of general anesthesia?: no    ASA 3     MAC     The patient is not a current smoker.  Patient was previously instructed to abstain from smoking on day of procedure.  Patient did not smoke on day of procedure.    intravenous induction   Anesthetic plan and risks discussed with patient.  Use of blood products discussed with patient who consented to blood products.    Plan discussed with CRNA.

## 2024-10-24 NOTE — PROGRESS NOTES
Pre-procedure teaching reinforced. Subjective   Resting in bed comfortably. Complains of odynophagia over the last few days.    Objective   Vitals:    10/24/24 1725   BP: 171/88   Pulse:    Resp:    Temp:    SpO2:        Physical Exam:   Constitutional: elderly, awake, alert, no acute distress  ENMT: mucous membranes moist, conjunctivae clear  Head/Neck: normocephalic, atraumatic; supple, trachea midline  Respiratory/Thorax: patent airways, CTAB; no wheezes, rales, or rhonchi  Cardiovascular: RRR, no murmur appreciated  Gastrointestinal: soft, nondistended, non-tender, bowel sounds appreciated  Extremities: palpable peripheral pulses, no edema  Neurological: AO x3, no focal deficits  Psychological: appropriate mood and behavior  Skin: warm and dry, 2nd right toe with nonpurulent wound    Scheduled Medications:   apixaban, 2.5 mg, oral, BID  cefepime, 1 g, intravenous, q12h  doxazosin, 4 mg, oral, Nightly  isosorbide mononitrate ER, 30 mg, oral, BID  metoprolol succinate XL, 25 mg, oral, Daily  pantoprazole, 40 mg, oral, BID AC  polyethylene glycol, 17 g, oral, BID  QUEtiapine, 12.5 mg, oral, Nightly  ranolazine, 500 mg, oral, Daily  sennosides-docusate sodium, 2 tablet, oral, BID  simethicone, 80 mg, oral, 4x daily  sucralfate, 1 g, oral, Before meals & nightly  sulfamethoxazole-trimethoprim, 1 tablet, oral, q12h DEJON  traZODone, 25 mg, oral, Nightly       Continuous Medications:       PRN Medications:   PRN medications: acetaminophen, amLODIPine, labetaloL, melatonin, ondansetron, polyethylene glycol    Assessment/Plan   Bebeto Cabezas is a 89 y.o. 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 He was recently seen at Urgent Care on 10/18 for open wound of 2nd right toe. He was started on Keflex, which was later changed to Bactrim and Cipro after WCx grew Pseudomonas and  MRSA.    Odynophagia  Acute cystitis  2nd right toe wound  NAGMA in setting of diarrhea  Non-MI troponin elevation  Cholelithiasis  Diverticulosis  Infrarenal AAA, 3.5cm, stable    Dementia  Hx DVT/PE s/p IVC filter, on low-dose Eliquis  Hx SDH  HTN  CKD 3  Chronic pain syndrome  Insomnia    Evaluated by SLP who noted odynophagia with PO trials. GI was subsequently consulted. Patient underwent EGD which revealed severe esophagitis along distal esophagus. Biopsies were taken. Will need repeat EGD in 2 months. PPI, Carafate x1 week.  Antibiotics were changed from Rocephin to Cefepime and Bactrim given recent WCx of right 2nd toe growing Pseudomonas and MRSA as patient was unable to tolerate PO Cipro, and he had taken only 1 dose of Bactrim. This should cover for UTI as well. UCx pending.  PT/OT had been ordered for dispo planning, but family had declined evaluation.    DVT PPX: low-dose Eliquis  Code status: FULL    Amador Summers [facility] Medicine

## 2024-10-24 NOTE — PROGRESS NOTES
Occupational Therapy                 Therapy Communication Note    Patient Name: Bebeto Cabezas  MRN: [id number]  Department: [facility] 3  Room: [facility]348-A  Today's Date: 10/24/2024     Discipline: Occupational Therapy     (OT screen completed. upon completing interview, patient spouse and daughter refusing eval. reporting patient uses w/c at home and they assist with all care. reporting patient does not need/want therapy. d/c OT orders at this time per family request.)

## 2024-10-24 NOTE — PROGRESS NOTES
Physical Therapy                 Therapy Communication Note    Patient Name: Bebeto Cabezas  MRN: [id number]  Department: [facility] 3  Room: [facility]348-  Today's Date: 10/24/2024     Discipline: Physical Therapy    PT screen completed. upon completing interview, patient spouse and daughter refusing eval. reporting patient uses w/c at home and they assist with all care. reporting patient does not need/want therapy. d/c PT orders at this time per family request.

## 2024-10-24 NOTE — ANESTHESIA POSTPROCEDURE EVALUATION
Patient: Bebeto Cabezas    Procedure Summary       Date: 10/24/24 Room / Location: [facility]    Anesthesia Start: 1439 Anesthesia Stop: 1458    Procedure: EGD Diagnosis: Generalized abdominal pain    Scheduled Providers: Emil Cardozo MD Responsible Provider: Alexi Garcia MD    Anesthesia Type: MAC ASA Status: 3            Anesthesia Type: MAC    Vitals Value Taken Time   /75 10/24/24 1459   Temp 36.5 10/24/24 1459   Pulse 80 10/24/24 1459   Resp 16 10/24/24 1459   SpO2 100 10/24/24 1459       Anesthesia Post Evaluation    Patient location during evaluation: PACU  Patient participation: waiting for patient participation  Level of consciousness: responsive to physical stimuli and sedated  Pain score: 0  Pain management: adequate  Airway patency: patent  Cardiovascular status: acceptable  Respiratory status: acceptable  Hydration status: acceptable  Postoperative Nausea and Vomiting: none        No notable events documented.

## 2024-10-24 NOTE — H&P
History Of Present Illness  Bebeto Cabezas is a 89 y.o. male PMHx HTN, h/o DVT/PE s/p IVC filter previously on warfarin now on Eliquis 2.5mg BID, h/o SDH, CKD 3, chronic pain syndrome, insomnia dementia presents to [facility] for abdominal pain, nausea and diarrhea.  Patient poor historian, history obtained from chart.  Recently diagnosed with a toe infection and was started on Bactrim.  Patient reported had multiple large bowel movements over the weekend and has been complaining of abdominal pain for the past 3 days.  There is also reports of nausea and vomiting, chest pain and difficulty swallowing.  CT imaging obtained and was negative for acute intra-abdominal process.  Troponin checked and was 23, this was repeated and was 23 once again.  Urinalysis positive for infection.     Past Medical History  No past medical history on file.    Surgical History  No past surgical history on file.     Social History  He has no history on file for tobacco use, alcohol use, and drug use.    Family History  No family history on file.     Allergies  Patient has no known allergies.    Review of Systems   Constitutional:  Negative for chills and fever.   HENT:  Negative for congestion, ear pain and sore throat.    Respiratory:  Negative for cough, shortness of breath and wheezing.    Cardiovascular:  Positive for chest pain. Negative for palpitations and leg swelling.   Gastrointestinal:  Positive for diarrhea, nausea and vomiting. Negative for abdominal pain and constipation.   Genitourinary:  Negative for difficulty urinating, dysuria and frequency.   Musculoskeletal:  Negative for myalgias.   Skin:  Negative for rash and wound.   Neurological:  Negative for dizziness, syncope and headaches.   All other systems reviewed and are negative.       Physical Exam     GEN:  awake, alert, not in acute distress  HEENT:  normocephalic, atraumatic, PERRL, EOM intact, nares patent  Cardio:  RRR, no murmurs  Resp:  CTAB, no wheezing  Abd:   +BS, soft, nontender  :  deferred  Neuro:  A&Ox1, CN2-12 grossly intact, no focal deficits  Msk:  no gross deformities, no joint effusions  Skin:  warm, dry, intact  Psych: demented    Last Recorded Vitals  Blood pressure (!) 187/80, pulse 68, temperature 36.4 °C (97.5 °F), temperature source Temporal, resp. rate 18, height 1.829 m (6'), weight 65.8 kg (145 lb 1 oz), SpO2 95%.    Relevant Results      Results for orders placed or performed during the hospital encounter of 10/23/24 (from the past 24 hours)   CBC and Auto Differential   Result Value Ref Range    WBC 8.5 4.4 - 11.3 x10*3/uL    nRBC 0.0 0.0 - 0.0 /100 WBCs    RBC 3.23 (L) 4.50 - 5.90 x10*6/uL    Hemoglobin 10.6 (L) 13.5 - 17.5 g/dL    Hematocrit 32.5 (L) 41.0 - 52.0 %     (H) 80 - 100 fL    MCH 32.8 26.0 - 34.0 pg    MCHC 32.6 32.0 - 36.0 g/dL    RDW 13.2 11.5 - 14.5 %    Platelets 239 150 - 450 x10*3/uL    Neutrophils % 73.7 40.0 - 80.0 %    Immature Granulocytes %, Automated 0.6 0.0 - 0.9 %    Lymphocytes % 16.1 13.0 - 44.0 %    Monocytes % 8.2 2.0 - 10.0 %    Eosinophils % 0.9 0.0 - 6.0 %    Basophils % 0.5 0.0 - 2.0 %    Neutrophils Absolute 6.28 (H) 1.60 - 5.50 x10*3/uL    Immature Granulocytes Absolute, Automated 0.05 0.00 - 0.50 x10*3/uL    Lymphocytes Absolute 1.37 0.80 - 3.00 x10*3/uL    Monocytes Absolute 0.70 0.05 - 0.80 x10*3/uL    Eosinophils Absolute 0.08 0.00 - 0.40 x10*3/uL    Basophils Absolute 0.04 0.00 - 0.10 x10*3/uL   Comprehensive metabolic panel   Result Value Ref Range    Glucose 101 (H) 74 - 99 mg/dL    Sodium 138 136 - 145 mmol/L    Potassium 4.1 3.5 - 5.3 mmol/L    Chloride 108 (H) 98 - 107 mmol/L    Bicarbonate 20 (L) 21 - 32 mmol/L    Anion Gap 14 10 - 20 mmol/L    Urea Nitrogen 24 (H) 6 - 23 mg/dL    Creatinine 1.78 (H) 0.50 - 1.30 mg/dL    eGFR 36 (L) >60 mL/min/1.73m*2    Calcium 8.6 8.6 - 10.3 mg/dL    Albumin 3.5 3.4 - 5.0 g/dL    Alkaline Phosphatase 76 33 - 136 U/L    Total Protein 6.0 (L) 6.4 - 8.2 g/dL     AST 16 9 - 39 U/L    Bilirubin, Total 0.7 0.0 - 1.2 mg/dL    ALT 7 (L) 10 - 52 U/L   Lipase   Result Value Ref Range    Lipase 6 (L) 9 - 82 U/L   Lactate   Result Value Ref Range    Lactate 1.2 0.4 - 2.0 mmol/L   Troponin I, High Sensitivity, Initial   Result Value Ref Range    Troponin I, High Sensitivity 23 (H) 0 - 20 ng/L   Troponin, High Sensitivity, 1 Hour   Result Value Ref Range    Troponin I, High Sensitivity 23 (H) 0 - 20 ng/L   Urinalysis with Reflex Culture and Microscopic   Result Value Ref Range    Color, Urine Light-Yellow Light-Yellow, Yellow, Dark-Yellow    Appearance, Urine Clear Clear    Specific Gravity, Urine 1.013 1.005 - 1.035    pH, Urine 5.5 5.0, 5.5, 6.0, 6.5, 7.0, 7.5, 8.0    Protein, Urine 10 (TRACE) NEGATIVE, 10 (TRACE), 20 (TRACE) mg/dL    Glucose, Urine Normal Normal mg/dL    Blood, Urine 0.03 (TRACE) (A) NEGATIVE    Ketones, Urine NEGATIVE NEGATIVE mg/dL    Bilirubin, Urine NEGATIVE NEGATIVE    Urobilinogen, Urine Normal Normal mg/dL    Nitrite, Urine 2+ (A) NEGATIVE    Leukocyte Esterase, Urine 500 Cammy/µL (A) NEGATIVE   Microscopic Only, Urine   Result Value Ref Range    WBC, Urine 6-10 (A) 1-5, NONE /HPF    RBC, Urine 1-2 NONE, 1-2, 3-5 /HPF    Bacteria, Urine 1+ (A) NONE SEEN /HPF    Hyaline Casts, Urine OCCASIONAL (A) NONE /LPF          Assessment/Plan   Assessment & Plan  UTI (urinary tract infection)      Bebeto Cabezas is a 89 y.o. male PMHx HTN, h/o DVT/PE s/p IVC filter previously on warfarin now on Eliquis 2.5mg BID, h/o SDH, CKD 3, chronic pain syndrome, insomnia dementia presents to [facility] for abdominal pain, nausea and diarrhea.    # Nausea and vomiting  # Dysphagia  # Acute cystitis   # Non MI troponin elevation  # HTN  # h/o DVT/PE s/p IVC filter on Eliquis  # h/o SDH  # CKD3  # Dementia  # Insomnia     Cause of nausea and vomiting unclear, could be precipitated by UTI.  Will treat with Ceftriaxone.  There is also reports of dysphagia from family, will have SLP  evaluate.  Troponin mildly elevated, suspect this is non MI troponin elevation.    Resume Metoprolol, Imdur, Cardura, Eliquis.  Zofran PRN nausea, ordered protonix.         I spent 55 minutes in the professional and overall care of this patient.    Med Gutierrez DO  Senior Attending Physician  [facility]  Clinical Instructor

## 2024-10-24 NOTE — DISCHARGE INSTRUCTIONS
We recommend that you follow-up with your primary care doctor 1 week to ensure that you are transitioning well from hospital to home.  It is important that you schedule a follow-up appointment in 2 months with as you will need a repeat upper endoscopy to reevaluate your esophagus.  You have been started on Bactrim (generic: Trimethoprim-sulfamethoxazole) for your UTI that is dosed per your kidney function.  Please do not use your prior prescription.  Continue taking Carafate as prescribed for 1 week.  Continue taking Protonix (generic: Pantoprazole) twice a day.  After working with our speech language pathologist this hospitalization, it was recommended that you continue with a diet consisting of soft and bite-sized foods.

## 2024-10-25 LAB
ANION GAP SERPL CALC-SCNC: 14 MMOL/L (ref 10–20)
BUN SERPL-MCNC: 24 MG/DL (ref 6–23)
CALCIUM SERPL-MCNC: 8.2 MG/DL (ref 8.6–10.3)
CHLORIDE SERPL-SCNC: 107 MMOL/L (ref 98–107)
CO2 SERPL-SCNC: 20 MMOL/L (ref 21–32)
CREAT SERPL-MCNC: 1.86 MG/DL (ref 0.5–1.3)
EGFRCR SERPLBLD CKD-EPI 2021: 34 ML/MIN/1.73M*2
ERYTHROCYTE [DISTWIDTH] IN BLOOD BY AUTOMATED COUNT: 12.5 % (ref 11.5–14.5)
GLUCOSE SERPL-MCNC: 84 MG/DL (ref 74–99)
HCT VFR BLD AUTO: 29.1 % (ref 41–52)
HGB BLD-MCNC: 9.5 G/DL (ref 13.5–17.5)
MCH RBC QN AUTO: 32.4 PG (ref 26–34)
MCHC RBC AUTO-ENTMCNC: 32.6 G/DL (ref 32–36)
MCV RBC AUTO: 99 FL (ref 80–100)
NRBC BLD-RTO: 0 /100 WBCS (ref 0–0)
PLATELET # BLD AUTO: 226 X10*3/UL (ref 150–450)
POTASSIUM SERPL-SCNC: 4.7 MMOL/L (ref 3.5–5.3)
RBC # BLD AUTO: 2.93 X10*6/UL (ref 4.5–5.9)
SODIUM SERPL-SCNC: 136 MMOL/L (ref 136–145)
WBC # BLD AUTO: 6 X10*3/UL (ref 4.4–11.3)

## 2024-10-25 PROCEDURE — 99232 SBSQ HOSP IP/OBS MODERATE 35: CPT | Performed by: STUDENT IN AN ORGANIZED HEALTH CARE EDUCATION/TRAINING PROGRAM

## 2024-10-25 PROCEDURE — 2500000001 HC RX 250 WO HCPCS SELF ADMINISTERED DRUGS (ALT 637 FOR MEDICARE OP): Performed by: NURSE PRACTITIONER

## 2024-10-25 PROCEDURE — 2500000001 HC RX 250 WO HCPCS SELF ADMINISTERED DRUGS (ALT 637 FOR MEDICARE OP): Performed by: INTERNAL MEDICINE

## 2024-10-25 PROCEDURE — 2500000002 HC RX 250 W HCPCS SELF ADMINISTERED DRUGS (ALT 637 FOR MEDICARE OP, ALT 636 FOR OP/ED): Performed by: STUDENT IN AN ORGANIZED HEALTH CARE EDUCATION/TRAINING PROGRAM

## 2024-10-25 PROCEDURE — 2500000004 HC RX 250 GENERAL PHARMACY W/ HCPCS (ALT 636 FOR OP/ED): Performed by: STUDENT IN AN ORGANIZED HEALTH CARE EDUCATION/TRAINING PROGRAM

## 2024-10-25 PROCEDURE — 85027 COMPLETE CBC AUTOMATED: CPT | Performed by: STUDENT IN AN ORGANIZED HEALTH CARE EDUCATION/TRAINING PROGRAM

## 2024-10-25 PROCEDURE — 92526 ORAL FUNCTION THERAPY: CPT | Mod: GN

## 2024-10-25 PROCEDURE — 36415 COLL VENOUS BLD VENIPUNCTURE: CPT | Performed by: INTERNAL MEDICINE

## 2024-10-25 PROCEDURE — 1100000001 HC PRIVATE ROOM DAILY

## 2024-10-25 PROCEDURE — 2500000001 HC RX 250 WO HCPCS SELF ADMINISTERED DRUGS (ALT 637 FOR MEDICARE OP): Performed by: STUDENT IN AN ORGANIZED HEALTH CARE EDUCATION/TRAINING PROGRAM

## 2024-10-25 PROCEDURE — 80048 BASIC METABOLIC PNL TOTAL CA: CPT | Performed by: INTERNAL MEDICINE

## 2024-10-25 PROCEDURE — 2500000002 HC RX 250 W HCPCS SELF ADMINISTERED DRUGS (ALT 637 FOR MEDICARE OP, ALT 636 FOR OP/ED): Performed by: INTERNAL MEDICINE

## 2024-10-25 ASSESSMENT — COGNITIVE AND FUNCTIONAL STATUS - GENERAL
DRESSING REGULAR LOWER BODY CLOTHING: A LITTLE
HELP NEEDED FOR BATHING: A LITTLE
MOVING TO AND FROM BED TO CHAIR: A LOT
CLIMB 3 TO 5 STEPS WITH RAILING: TOTAL
DAILY ACTIVITIY SCORE: 17
EATING MEALS: A LITTLE
TOILETING: A LOT
MOBILITY SCORE: 13
TURNING FROM BACK TO SIDE WHILE IN FLAT BAD: A LITTLE
STANDING UP FROM CHAIR USING ARMS: A LOT
DRESSING REGULAR UPPER BODY CLOTHING: A LITTLE
PERSONAL GROOMING: A LITTLE
MOVING FROM LYING ON BACK TO SITTING ON SIDE OF FLAT BED WITH BEDRAILS: A LITTLE
WALKING IN HOSPITAL ROOM: A LOT

## 2024-10-25 ASSESSMENT — PAIN SCALES - GENERAL
PAINLEVEL_OUTOF10: 0 - NO PAIN
PAINLEVEL_OUTOF10: 0 - NO PAIN

## 2024-10-25 NOTE — CONSULTS
Nutrition Initial Assessment:   Nutrition Assessment    Reason for Assessment: Admission nursing screening    Patient is a 89 y.o. male presenting with UTI  PMHx HTN, h/o DVT/PE s/p IVC filter previously on warfarin now on Eliquis 2.5mg BID, h/o SDH, CKD 3, chronic pain syndrome, insomnia dementia     Nutrition History:  Energy Intake: Poor < 50 % (x 1 documented meal)  Food and Nutrient History: RDN consult for MST score of 3. Met with family at bedside, pt unable to provide history. Family states that pt does not eat well, reports that pt lives with them, has not had great appetite for the past year or so since he had extended hospitalization at [facility] requiring PEG tube placement. Family states  lbs, reports he has had wt loss over the past several months due to poor po intake. Family states they have tried Ensure and Boost but pt doesnt like it. Family reports constipation, states pt missing teeth. Pt with hx swallowing difficulty but taking po now, seen by SLP 10/24. Discussed trial of Mighty Shake and Magic Cup - family agreeable. WIll continue to monitor.  Vitamin/Herbal Supplement Use: melatonin per home med list  Food Allergies/Intolerances:  None  GI Symptoms: Constipation  Oral Problems: Chewing difficulty       Anthropometrics:  Height: 182.9 cm (6')   Weight: 65 kg (143 lb 4.8 oz)   BMI (Calculated): 19.43  IBW/kg (Dietitian Calculated): 80.9 kg  Percent of IBW: 80 %       Weight History:   Wt Readings from Last 10 Encounters:   10/24/24 65 kg (143 lb 4.8 oz)   10/18/24 65.8 kg (145 lb)   07/06/23 109 kg (240 lb 6 oz)      Weight Change %:  Weight History / % Weight Change: per chart review, pt with 97 lb, 40% wt loss in > 1 year    Nutrition Focused Physical Exam Findings:  defer: for pt comfort, pt with visible moderate to severe muscle loss to temple, clavicle and fat loss to orbital and buccal region  Subcutaneous Fat Loss:      Muscle Wasting:     Edema:  Edema: none  Physical Findings:  Skin:  Positive (wound noted to left great and second toe)    Nutrition Significant Labs:  BMP Trend:   Results from last 7 days   Lab Units 10/25/24  0609 10/24/24  0652 10/23/24  1858   GLUCOSE mg/dL 84 81 101*   CALCIUM mg/dL 8.2* 8.1* 8.6   SODIUM mmol/L 136 137 138   POTASSIUM mmol/L 4.7 4.2 4.1   CO2 mmol/L 20* 20* 20*   CHLORIDE mmol/L 107 108* 108*   BUN mg/dL 24* 23 24*   CREATININE mg/dL 1.86* 1.71* 1.78*    , A1C:  Lab Results   Component Value Date    HGBA1C 7.1 11/06/2018   , BG POCT trend:        Nutrition Specific Medications:  Scheduled medications  pantoprazole, 40 mg, oral, BID AC  polyethylene glycol, 17 g, oral, BID  sennosides-docusate sodium, 2 tablet, oral, BID    I/O:   Last BM Date: 10/21/24;      Dietary Orders (From admission, onward)       Start     Ordered    10/24/24 1458  Adult diet Regular; Soft and bite sized 6  Diet effective now        Question Answer Comment   Diet type Regular    Texture Soft and bite sized 6        10/24/24 1458    10/24/24 0104  May Participate in Room Service With Assistance  ( ROOM SERVICE MAY PARTICIPATE WITH ASSISTANCE)  Once        Question:  .  Answer:  Yes    10/24/24 0103                     Estimated Needs:   Total Energy Estimated Needs (kCal): 1625 kCal  Method for Estimating Needs: 25 kcal/kg ABW  Total Protein Estimated Needs (g): 78 g  Method for Estimating Needs: 1.2 g/kg ABW  Total Fluid Estimated Needs (mL): 1625 mL  Method for Estimating Needs: 1 ml/kcal or per MD        Nutrition Diagnosis   Malnutrition Diagnosis  Patient has Malnutrition Diagnosis: Yes  Diagnosis Status: New  Malnutrition Diagnosis: Moderate malnutrition related to starvation  As Evidenced by: <75% of estimated energy requirements for > 3 months, 40% wt loss in > 1 year, visible moderate to severe muscle wasting and fat loss            Nutrition Interventions/Recommendations         Nutrition Prescription:  Individualized Nutrition Prescription Provided for : Regular, Soft and  Bite Size 6 with ONS        Nutrition Interventions:   Interventions: Meals and snacks, Medical food supplement  Meals and Snacks: General healthful diet, Texture-modified diet  Goal: Consumes 3 meals per day  Medical Food Supplement: Commercial beverage  Goal: Magic cup with lunch (290 kcal, 9 g protein per serving). Mighty Shake BID (220 kcal and 6 g protein per serving).         Nutrition Education:   No needs at this time       Nutrition Monitoring and Evaluation   Food/Nutrient Related History Monitoring  Monitoring and Evaluation Plan: Energy intake, Amount of food, Fluid intake  Energy Intake: Estimated energy intake  Criteria: Pt meets >75% of estimated energy needs  Fluid Intake: Estimated fluid intake  Criteria: Meets >75% of estimated fluid needs  Amount of Food: Estimated amout of food, Medical food intake  Criteria: Pt consumes >50% of meals and supplements    Body Composition/Growth/Weight History  Monitoring and Evaluation Plan: Weight  Weight: Measured weight  Criteria: Maintains stable weight    Biochemical Data, Medical Tests and Procedures  Monitoring and Evaluation Plan: Glucose/endocrine profile, Electrolyte/renal panel  Electrolyte and Renal Panel: Sodium, Potassium, Phosphorus  Criteria: Electrolytes WNL  Glucose/Endocrine Profile: Glucose, casual  Criteria: BG within desirable range              Time Spent (min): 45 minutes

## 2024-10-25 NOTE — PROGRESS NOTES
Subjective   Resting in bed comfortably. Complains of odynophagia over the last few days.    Objective   Vitals:    10/25/24 1318   BP: 133/66   Pulse: 74   Resp:    Temp: 36.5 °C (97.7 °F)   SpO2: 93%       Physical Exam:   Constitutional: elderly, awake, alert, no acute distress  ENMT: mucous membranes moist, conjunctivae clear  Head/Neck: normocephalic, atraumatic; supple, trachea midline  Respiratory/Thorax: patent airways, CTAB; no wheezes, rales, or rhonchi  Cardiovascular: RRR, no murmur appreciated  Gastrointestinal: soft, nondistended, non-tender, bowel sounds appreciated  Extremities: palpable peripheral pulses, no edema  Neurological: AO x3, no focal deficits  Psychological: appropriate mood and behavior  Skin: warm and dry, 2nd right toe with nonpurulent wound    Scheduled Medications:   apixaban, 2.5 mg, oral, BID  cefepime, 1 g, intravenous, q12h  doxazosin, 4 mg, oral, Nightly  isosorbide mononitrate ER, 30 mg, oral, BID  metoprolol succinate XL, 25 mg, oral, Daily  pantoprazole, 40 mg, oral, BID AC  polyethylene glycol, 17 g, oral, BID  QUEtiapine, 12.5 mg, oral, Nightly  ranolazine, 500 mg, oral, Daily  sennosides-docusate sodium, 2 tablet, oral, BID  simethicone, 80 mg, oral, 4x daily  sucralfate, 1 g, oral, q6h DEJON  sulfamethoxazole-trimethoprim, 1 tablet, oral, q12h DEJON  traZODone, 25 mg, oral, Nightly       Continuous Medications:       PRN Medications:   PRN medications: acetaminophen, amLODIPine, labetaloL, melatonin, ondansetron, polyethylene glycol    Assessment/Plan   Bebeto Cabezas is a 89 y.o. male PMHx HTN, h/o DVT/PE s/p IVC filter previously on warfarin now on Eliquis 2.5mg BID, h/o SDH, CKD 3, chronic pain syndrome, insomnia dementia presents to [facility] for abdominal pain, nausea and diarrhea. He was recently seen at Urgent Care on 10/18 for open wound of 2nd right toe. He was started on Keflex, which was later changed to Bactrim and Cipro after WCx grew Pseudomonas and  MRSA.    Severe esophagitis  Acute cystitis  2nd right toe wound  NAGMA in setting of diarrhea  Non-MI troponin elevation  HTN urgency    Cholelithiasis  Diverticulosis  Infrarenal AAA, 3.5cm, stable    Dementia  Hx DVT/PE s/p IVC filter, on low-dose Eliquis  Hx SDH  HTN  CKD 3b  Chronic pain syndrome  Insomnia    Underwent EGD on 10/24 which revealed severe esophagitis along distal esophagus. Biopsies were taken. Will need repeat EGD in 2 months. Continue PPI, Carafate x1 week.  Continue Cefepime and Bactrim (Day 2) given recent WCx of right 2nd toe growing Pseudomonas and MRSA as patient was unable to tolerate PO Cipro, and he had taken only 1 dose of Bactrim. This should cover for UTI as well. UCx growing Enteric bacilli.  Re: HTN urgency. Typically maintained on Imdur, Toprol, Cardura, and PRN Norvasc. Will continue to monitor, consider adjusting regimen but would prefer to avoid aggressive control given advanced age and labile BPs.  PT/OT had been ordered for dispo planning, but family had declined evaluation.    DVT PPX: low-dose Eliquis  Code status: FULL    Amador Summers [facility]

## 2024-10-25 NOTE — PROGRESS NOTES
10/25/24 1624   Discharge Planning   Living Arrangements Spouse/significant other   Support Systems Spouse/significant other;Children   Assistance Needed ADLs and IADLs   Type of Residence Private residence   Home or Post Acute Services None   Expected Discharge Disposition Home   Does the patient need discharge transport arranged? No     TCC Note: I met with pt and family at bedside. Verified name, , demographics. Pt lives with wife and uses a wheelchair at [place]. Daughter very involved as well. Pt denies recent falls. Needs assist with ADLs and IADLs. Pt/family denies any home going need. Family will transport home. Care Coordination team with follow for assistance with discharge planning as needed. Christy Davalos, MSN, RN, TCC.

## 2024-10-25 NOTE — CARE PLAN
"The patient's goals for the shift include \"to find out what antibotic to use\"    The clinical goals for the shift include Pt will remain safe and comfortable throughout the shift        Skin  Add All  Participates in plan/prevention/treatment measures  Add  Today at 1520 - Progressing by Andreina Tran RN  Add  Flowsheets  Taken today at 1520  Participates in plan/prevention/treatment measures  Elevate heels  Prevent/manage excess moisture  Add  Today at 1520 - Progressing by Andreina rTan RN  Add  Flowsheets  Taken today at 1520  Prevent/manage excess moisture  Cleanse incontinence/protect with barrier cream  Prevent/minimize sheer/friction injuries  Add  Today at 1520 - Progressing by Andreina Tran RN  Add  Flowsheets  Taken today at 1520  Prevent/minimize sheer/friction injuries  Use pull sheet  Promote/optimize nutrition  Add  Today at 1520 - Progressing by Andreina Tran RN  Add  Flowsheets  Taken today at 1520  Promote/optimize nutrition  Monitor/record intake including meals  Promote skin healing  Add  Today at 1520 - Progressing by Andreina Tran RN  Add  Flowsheets  Taken today at 1520  Promote skin healing  Assess skin/pad under line(s)/device(s)  Fall/Injury  Add All  Not fall by end of shift  Add  Today at 1520 - Progressing by Andreina Tran RN  Add  Be free from injury by end of the shift  Add  Today at 1520 - Progressing by Andreina Tran RN  Add  Verbalize understanding of personal risk factors for fall in the hospital  Add  Today at 1520 - Progressing by Andreina Tran RN  Add  Verbalize understanding of risk factor reduction measures to prevent injury from fall in the home  Add  Today at 1520 - Progressing by Andreina Tran RN  Add  Use assistive devices by end of the shift  Add  Today at 1520 - Progressing by Andreina Tran RN  Add  Pace activities to prevent fatigue by end of the shift  Add  Today at 1520 - Progressing by Andreina MARQUEZ" ELODIA Tran  Add  Nutrition  Add All  Oral intake greater than 50%  Add  Today at 1520 - Progressing by Andreina Tran, RN  Add  Consume prescribed supplement  Add  Today at 1520 - Progressing by Andreina Tran RN  Add  Adequate PO fluid intake  Add  Today at 1520 - Progressing by Andreina Tran RN  Add  BG  mg/dL  Add  Today at 1520 - Progressing by Andreina Tran RN  Add  Lab values WNL  Add  Today at 1520 - Progressing by Andreina Tran, RN  Add  Electrolytes WNL  Add  Today at 1520 - Progressing by Andreina Tran RN  Add  Maintain stable weight  Add  Today at 1520 - Progressing by Andreina Tran RN

## 2024-10-25 NOTE — PROGRESS NOTES
Speech-Language Pathology    SLP Adult Inpatient  Speech-Language Pathology Treatment     Patient Name: Bebeto Cabezas  MRN: [id number]  Today's Date: 10/25/2024      Current Problem:   1. Generalized abdominal pain  Esophagogastroduodenoscopy (EGD)    Esophagogastroduodenoscopy (EGD)    Surgical Pathology Exam    Surgical Pathology Exam      2. Nausea and vomiting, unspecified vomiting type  Surgical Pathology Exam    Surgical Pathology Exam      3. Lower urinary tract infection, acute  Surgical Pathology Exam    Surgical Pathology Exam            SLP Tx Per POC established 10/24/2024: Functional Oropharyngeal Swallow Noted on Modified Baseline Diet of Soft and Bite Sized Solids and Thins  Pt seen this date to determine if patient is tolerating diet of soft and bite sized thins. GI provided pt with this diet due to it being the pt's baseline diet d/t primarily edentulous with minimal teeth. Pt's wife Julia reported that the pt prefers soft foods cut up small at home. Pt's odynophagia is lessening with medical management by GI status post EGD.  Pt participated in PO trials of 10x puree, 15x thin liquids    Labial seal intact on cup, straw and tsp of thins and puree. No oral pocketing or oral holding noted with puree and soft solids. Pt presents with slow but functional mastication with minimal teeth. Pt presents with mildly prolonged but functional oral bolus formation/manipulation across soft and bite sized trials self-fed. No oral residue observed after the swallow.    No overt s/s of aspiration across trialed consistencies. No complaints of bolus sensations in the throat. No coughing noted throughout the tx session. However, pt reports pain with swallowing in the esophagus, improved from yesterday.   GI is following this pt d/t severe esophagitis and odynophagia management.     Safe swallow demonstrated on baseline diet of soft and bite sized thins. Further ST not needed at this time.    SLP TX Intervention  Outcome: Making Progress Towards Goals  SLP Assessment Results:  (Pain while swallowing d/t esophagitis)  Prognosis: Good  Treatment Tolerance: Patient tolerated treatment well  Medical Staff Made Aware: Yes  Strengths: Cognition, Motivation, Family/Caregiver Support  Barriers: Comorbidities       Plan:  Inpatient/Swing Bed or Outpatient: Inpatient  Treatment/Interventions:  (Swallowing)  SLP TX Plan: Discharge from Speech Therapy  Patient/Caregiver Agreeable: Yes (Wife Constanec)  Goals : Established 10/24 2/24   Pt will tolerate the prescribed diet with adequate oral phase and no s/s of aspiration. Goal met 10/25.  Advancing PO trials with SLP  as tolerated for diet advancement. Goal met 10/25.  Pt will tolerate the LRD with no c/o odynphagia across meals /PO. Goal ongoing with GI managing. Goal discharge.    Subjective   Pt repositioned upright in bed. Pt is pleasant, alert and oriented x4. Pt is hard of hearing but understood all presented instructions. Pt's wife constance remained at bedside for the duration of the session. Pt followed all presented instructions.    Most Recent Visit:  SLP Received On:  (ELODIA Hdz)    General Visit Information:   Prior to Session Communication: Bedside nurse (RN)      Objective   EGD 10/24  Impressions  Underwent EGD on 10/24 which revealed severe esophagitis along distal esophagus. Biopsies were taken. Will need repeat EGD in 2 months. Continue PPI, Carafate x1 week.    Therapeutic Swallow:  Therapeutic Swallow Intervention : PO Trials, Compensatory Strategies, Caregiver Education  Solid Diet Recommendations: Soft & bite sized/chopped (IDDSI Level 6)  Liquid Diet Recommendations: Thin (IDDSI Level 0)    Inpatient:  Education Documentation  SLP provides patient, daughter and wife Constance with the results and recommendation of the session.   General Reflux precautions reviewed with family.    JIMMY SHAHID-SLP   SLP provided this student with direct supervision  throughout the entire session.

## 2024-10-25 NOTE — CARE PLAN
"The patient's goals for the shift include \"to find out what antibotic to use\"    The clinical goals for the shift include Remain hemodynamically stable      Problem: Skin  Goal: Participates in plan/prevention/treatment measures  Flowsheets (Taken 10/25/2024 0332)  Participates in plan/prevention/treatment measures: Elevate heels  Goal: Prevent/manage excess moisture  Flowsheets (Taken 10/25/2024 0332)  Prevent/manage excess moisture: Cleanse incontinence/protect with barrier cream  Goal: Prevent/minimize sheer/friction injuries  Flowsheets (Taken 10/25/2024 0332)  Prevent/minimize sheer/friction injuries:   Use pull sheet   Increase activity/out of bed for meals  Goal: Promote/optimize nutrition  Flowsheets (Taken 10/25/2024 0332)  Promote/optimize nutrition: Monitor/record intake including meals  Goal: Promote skin healing  Flowsheets (Taken 10/25/2024 0332)  Promote skin healing:   Assess skin/pad under line(s)/device(s)   Protective dressings over bony prominences       "

## 2024-10-26 VITALS
WEIGHT: 143.3 LBS | OXYGEN SATURATION: 95 % | HEIGHT: 72 IN | RESPIRATION RATE: 18 BRPM | SYSTOLIC BLOOD PRESSURE: 174 MMHG | HEART RATE: 69 BPM | BODY MASS INDEX: 19.41 KG/M2 | DIASTOLIC BLOOD PRESSURE: 82 MMHG | TEMPERATURE: 97.3 F

## 2024-10-26 LAB
ANION GAP SERPL CALC-SCNC: 12 MMOL/L (ref 10–20)
BACTERIA UR CULT: ABNORMAL
BUN SERPL-MCNC: 25 MG/DL (ref 6–23)
CALCIUM SERPL-MCNC: 8.3 MG/DL (ref 8.6–10.3)
CHLORIDE SERPL-SCNC: 107 MMOL/L (ref 98–107)
CO2 SERPL-SCNC: 21 MMOL/L (ref 21–32)
CREAT SERPL-MCNC: 2.06 MG/DL (ref 0.5–1.3)
EGFRCR SERPLBLD CKD-EPI 2021: 30 ML/MIN/1.73M*2
ERYTHROCYTE [DISTWIDTH] IN BLOOD BY AUTOMATED COUNT: 12.5 % (ref 11.5–14.5)
GLUCOSE SERPL-MCNC: 89 MG/DL (ref 74–99)
HCT VFR BLD AUTO: 31.1 % (ref 41–52)
HGB BLD-MCNC: 9.9 G/DL (ref 13.5–17.5)
HOLD SPECIMEN: NORMAL
MCH RBC QN AUTO: 32 PG (ref 26–34)
MCHC RBC AUTO-ENTMCNC: 31.8 G/DL (ref 32–36)
MCV RBC AUTO: 101 FL (ref 80–100)
NRBC BLD-RTO: 0 /100 WBCS (ref 0–0)
PLATELET # BLD AUTO: 238 X10*3/UL (ref 150–450)
POTASSIUM SERPL-SCNC: 4 MMOL/L (ref 3.5–5.3)
RBC # BLD AUTO: 3.09 X10*6/UL (ref 4.5–5.9)
SODIUM SERPL-SCNC: 136 MMOL/L (ref 136–145)
WBC # BLD AUTO: 5.8 X10*3/UL (ref 4.4–11.3)

## 2024-10-26 PROCEDURE — 2500000004 HC RX 250 GENERAL PHARMACY W/ HCPCS (ALT 636 FOR OP/ED): Performed by: STUDENT IN AN ORGANIZED HEALTH CARE EDUCATION/TRAINING PROGRAM

## 2024-10-26 PROCEDURE — 36415 COLL VENOUS BLD VENIPUNCTURE: CPT | Performed by: STUDENT IN AN ORGANIZED HEALTH CARE EDUCATION/TRAINING PROGRAM

## 2024-10-26 PROCEDURE — 85027 COMPLETE CBC AUTOMATED: CPT | Performed by: STUDENT IN AN ORGANIZED HEALTH CARE EDUCATION/TRAINING PROGRAM

## 2024-10-26 PROCEDURE — 2500000002 HC RX 250 W HCPCS SELF ADMINISTERED DRUGS (ALT 637 FOR MEDICARE OP, ALT 636 FOR OP/ED): Performed by: STUDENT IN AN ORGANIZED HEALTH CARE EDUCATION/TRAINING PROGRAM

## 2024-10-26 PROCEDURE — 2500000001 HC RX 250 WO HCPCS SELF ADMINISTERED DRUGS (ALT 637 FOR MEDICARE OP): Performed by: STUDENT IN AN ORGANIZED HEALTH CARE EDUCATION/TRAINING PROGRAM

## 2024-10-26 PROCEDURE — 2500000001 HC RX 250 WO HCPCS SELF ADMINISTERED DRUGS (ALT 637 FOR MEDICARE OP): Performed by: INTERNAL MEDICINE

## 2024-10-26 PROCEDURE — 2500000001 HC RX 250 WO HCPCS SELF ADMINISTERED DRUGS (ALT 637 FOR MEDICARE OP): Performed by: NURSE PRACTITIONER

## 2024-10-26 PROCEDURE — 80048 BASIC METABOLIC PNL TOTAL CA: CPT | Performed by: STUDENT IN AN ORGANIZED HEALTH CARE EDUCATION/TRAINING PROGRAM

## 2024-10-26 PROCEDURE — 99239 HOSP IP/OBS DSCHRG MGMT >30: CPT | Performed by: STUDENT IN AN ORGANIZED HEALTH CARE EDUCATION/TRAINING PROGRAM

## 2024-10-26 RX ORDER — SULFAMETHOXAZOLE AND TRIMETHOPRIM 400; 80 MG/1; MG/1
1 TABLET ORAL EVERY 12 HOURS SCHEDULED
Status: DISCONTINUED | OUTPATIENT
Start: 2024-10-26 | End: 2024-10-26 | Stop reason: HOSPADM

## 2024-10-26 RX ORDER — CARVEDILOL 12.5 MG/1
12.5 TABLET ORAL 2 TIMES DAILY
Status: DISCONTINUED | OUTPATIENT
Start: 2024-10-26 | End: 2024-10-26 | Stop reason: HOSPADM

## 2024-10-26 RX ORDER — LACTULOSE 10 G/15ML
20 SOLUTION ORAL ONCE
Status: COMPLETED | OUTPATIENT
Start: 2024-10-26 | End: 2024-10-26

## 2024-10-26 RX ORDER — PANTOPRAZOLE SODIUM 40 MG/1
40 TABLET, DELAYED RELEASE ORAL
Qty: 60 TABLET | Refills: 0 | Status: SHIPPED | OUTPATIENT
Start: 2024-10-26 | End: 2024-11-25

## 2024-10-26 RX ORDER — CARVEDILOL 12.5 MG/1
12.5 TABLET ORAL 2 TIMES DAILY
Qty: 60 TABLET | Refills: 0 | Status: SHIPPED | OUTPATIENT
Start: 2024-10-26 | End: 2024-11-25

## 2024-10-26 RX ORDER — BISACODYL 10 MG/1
10 SUPPOSITORY RECTAL DAILY PRN
Status: DISCONTINUED | OUTPATIENT
Start: 2024-10-26 | End: 2024-10-26 | Stop reason: HOSPADM

## 2024-10-26 RX ORDER — SUCRALFATE 1 G/10ML
1 SUSPENSION ORAL EVERY 6 HOURS SCHEDULED
Qty: 200 ML | Refills: 0 | Status: SHIPPED | OUTPATIENT
Start: 2024-10-26 | End: 2024-10-31

## 2024-10-26 RX ORDER — SULFAMETHOXAZOLE AND TRIMETHOPRIM 400; 80 MG/1; MG/1
1 TABLET ORAL EVERY 12 HOURS SCHEDULED
Qty: 8 TABLET | Refills: 0 | Status: SHIPPED | OUTPATIENT
Start: 2024-10-26 | End: 2024-10-30

## 2024-10-26 ASSESSMENT — PAIN SCALES - GENERAL: PAINLEVEL_OUTOF10: 0 - NO PAIN

## 2024-10-26 ASSESSMENT — COGNITIVE AND FUNCTIONAL STATUS - GENERAL
STANDING UP FROM CHAIR USING ARMS: A LOT
MOVING TO AND FROM BED TO CHAIR: A LOT
TURNING FROM BACK TO SIDE WHILE IN FLAT BAD: A LITTLE
MOBILITY SCORE: 13
EATING MEALS: A LITTLE
CLIMB 3 TO 5 STEPS WITH RAILING: TOTAL
DAILY ACTIVITIY SCORE: 17
DRESSING REGULAR UPPER BODY CLOTHING: A LITTLE
DRESSING REGULAR LOWER BODY CLOTHING: A LITTLE
WALKING IN HOSPITAL ROOM: A LOT
MOVING FROM LYING ON BACK TO SITTING ON SIDE OF FLAT BED WITH BEDRAILS: A LITTLE
HELP NEEDED FOR BATHING: A LITTLE
TOILETING: A LOT
PERSONAL GROOMING: A LITTLE

## 2024-10-26 NOTE — CARE PLAN
"  Problem: Skin  Goal: Participates in plan/prevention/treatment measures  Outcome: Progressing  Goal: Prevent/manage excess moisture  Outcome: Progressing  Goal: Prevent/minimize sheer/friction injuries  Outcome: Progressing  Goal: Promote/optimize nutrition  Outcome: Progressing  Goal: Promote skin healing  Outcome: Progressing   The patient's goals for the shift include \"to find out what antibotic to use\"    The clinical goals for the shift include safety and hds        "

## 2024-10-26 NOTE — CARE PLAN
"The patient's goals for the shift include \"to find out what antibotic to use\"    The clinical goals for the shift include pt will not fall for the remainder of the shift    Over the shift, the patient did not make progress toward the following goals. Barriers to progression include pt forgetful and BLE weakness. Recommendations to address these barriers include posey alarm on.    "

## 2024-10-26 NOTE — DISCHARGE SUMMARY
Hospital Medicine Discharge Summary    Patient Name: Bebeto Cabezas  YOB: 1934    Discharge Diagnosis:   Severe esophagitis  Acute cystitis, Klebsiella  2nd right toe wound  HTN urgency  Non-MI troponin elevation  NAGMA in setting of diarrhea, resolved  Discharge Date: 10/26/24  Discharge Location: Home    Hospital Course:  This is a 89-year-old male, with history of dementia, hx DVT/PE s/p IVC filter (on low-dose Eliquis), hx SDH, HTN, CKD 3, chronic pain syndrome, and insomnia, who initially presented to [facility] with odynophagia, abdominal pain, nausea, diarrhea.  He was found have Klebsiella UTI for which he was maintained on Bactrim.  He was additionally maintained on cefepime given recent second right toe wound with growing Pseudomonas and MRSA.  He had been maintained on Cipro and Bactrim outpatient for this.  In regards to his odynophagia, he underwent EGD on 10/24, which revealed severe esophagitis.  He was started on PPI and Carafate.  Patient and his family had declined any PT/OT evaluations or need for SNF.  Patient will be discharged home with instructions to follow-up with his PCP and GI.  He will need repeat EGD in 2 months to reevaluate his esophagus.  Biopsies are still pending at time of discharge.  Per SLP, it is recommended that he continue with soft, bite-size diet with thin liquids.    Discharge Time Spent: >35 minutes    Physical Exam:     /82 (BP Location: Left arm, Patient Position: Lying)   Pulse 69   Temp 36.3 °C (97.3 °F) (Temporal)   Resp 18   Ht 1.829 m (6')   Wt 65 kg (143 lb 4.8 oz)   SpO2 95%   BMI 19.43 kg/m²     Physical Exam:  Constitutional: elderly, awake, alert, no acute distress  ENMT: mucous membranes moist, conjunctivae clear  Head/Neck: normocephalic, atraumatic; supple, trachea midline  Respiratory/Thorax: patent airways, CTAB; no wheezes, rales, or rhonchi  Cardiovascular: RRR, no murmur appreciated  Gastrointestinal: soft, nondistended,  non-tender, bowel sounds appreciated  Extremities: palpable peripheral pulses, no edema  Neurological: AO x3, no focal deficits  Psychological: appropriate mood and behavior  Skin: warm and dry; healing 2nd right toe wound that is without erythema, drainage, or tenderness    Discharge Medications:     Your medication list        START taking these medications        Instructions Last Dose Given Next Dose Due   carvedilol 12.5 mg tablet  Commonly known as: Coreg      Take 1 tablet (12.5 mg) by mouth 2 times a day.       pantoprazole 40 mg EC tablet  Commonly known as: ProtoNix      Take 1 tablet (40 mg) by mouth 2 times a day before meals. Do not crush, chew, or split.       sucralfate 100 mg/mL suspension  Commonly known as: Carafate      Take 10 mL (1 g) by mouth every 6 hours for 5 days.       sulfamethoxazole-trimethoprim 400-80 mg tablet  Commonly known as: Bactrim  Replaces: sulfamethoxazole-trimethoprim 800-160 mg tablet      Take 1 tablet by mouth every 12 hours for 4 days.              CONTINUE taking these medications        Instructions Last Dose Given Next Dose Due   amLODIPine 2.5 mg tablet  Commonly known as: Norvasc           doxazosin 4 mg tablet  Commonly known as: Cardura           Eliquis 2.5 mg tablet  Generic drug: apixaban           isosorbide mononitrate ER 30 mg 24 hr tablet  Commonly known as: Imdur           melatonin 10 mg capsule           QUEtiapine 25 mg tablet  Commonly known as: SEROquel           ranolazine 500 mg 12 hr tablet  Commonly known as: Ranexa           traZODone 50 mg tablet  Commonly known as: Desyrel                  STOP taking these medications      cephalexin 500 mg capsule  Commonly known as: Keflex        ciprofloxacin 500 mg tablet  Commonly known as: Cipro        metoprolol succinate XL 25 mg 24 hr tablet  Commonly known as: Toprol-XL        sulfamethoxazole-trimethoprim 800-160 mg tablet  Commonly known as: Bactrim DS  Replaced by: sulfamethoxazole-trimethoprim  400-80 mg tablet                  Where to Get Your Medications        These medications were sent to [facility]/pharmacy #[id number] - [place], [place] - [address] [address] [address]  [address] [address] [facility], [place] [id number]      Phone: [phone]   carvedilol 12.5 mg tablet  pantoprazole 40 mg EC tablet  sucralfate 100 mg/mL suspension  sulfamethoxazole-trimethoprim 400-80 mg tablet          No follow-ups on file.    Amador Summers [facility]

## 2024-10-31 LAB
LABORATORY COMMENT REPORT: NORMAL
PATH REPORT.FINAL DX SPEC: NORMAL
PATH REPORT.GROSS SPEC: NORMAL
PATH REPORT.RELEVANT HX SPEC: NORMAL
PATH REPORT.TOTAL CANCER: NORMAL

## 2024-11-03 LAB
ATRIAL RATE: 78 BPM
P AXIS: 15 DEGREES
P OFFSET: 136 MS
P ONSET: 68 MS
PR INTERVAL: 294 MS
Q ONSET: 215 MS
QRS COUNT: 13 BEATS
QRS DURATION: 122 MS
QT INTERVAL: 402 MS
QTC CALCULATION(BAZETT): 458 MS
QTC FREDERICIA: 438 MS
R AXIS: -64 DEGREES
T AXIS: 9 DEGREES
T OFFSET: 416 MS
VENTRICULAR RATE: 78 BPM

## 2025-01-16 ENCOUNTER — APPOINTMENT (OUTPATIENT)
Dept: GASTROENTEROLOGY | Facility: CLINIC | Age: OVER 89
End: 2025-01-16
Payer: MEDICARE

## 2025-01-30 ENCOUNTER — APPOINTMENT (OUTPATIENT)
Dept: GASTROENTEROLOGY | Facility: CLINIC | Age: OVER 89
End: 2025-01-30
Payer: MEDICARE